# Patient Record
Sex: MALE | Race: WHITE | NOT HISPANIC OR LATINO | Employment: OTHER | URBAN - METROPOLITAN AREA
[De-identification: names, ages, dates, MRNs, and addresses within clinical notes are randomized per-mention and may not be internally consistent; named-entity substitution may affect disease eponyms.]

---

## 2017-05-19 ENCOUNTER — TELEPHONE (OUTPATIENT)
Dept: PEDIATRICS | Facility: CLINIC | Age: 32
End: 2017-05-19

## 2017-05-19 ENCOUNTER — OFFICE VISIT (OUTPATIENT)
Dept: FAMILY MEDICINE | Facility: CLINIC | Age: 32
End: 2017-05-19
Payer: COMMERCIAL

## 2017-05-19 VITALS
OXYGEN SATURATION: 98 % | BODY MASS INDEX: 28.5 KG/M2 | DIASTOLIC BLOOD PRESSURE: 68 MMHG | WEIGHT: 203.6 LBS | HEIGHT: 71 IN | SYSTOLIC BLOOD PRESSURE: 117 MMHG | HEART RATE: 57 BPM | TEMPERATURE: 97.4 F

## 2017-05-19 DIAGNOSIS — R07.0 THROAT PAIN: Primary | ICD-10-CM

## 2017-05-19 LAB
DEPRECATED S PYO AG THROAT QL EIA: NORMAL
MICRO REPORT STATUS: NORMAL
SPECIMEN SOURCE: NORMAL

## 2017-05-19 PROCEDURE — 99213 OFFICE O/P EST LOW 20 MIN: CPT | Performed by: NURSE PRACTITIONER

## 2017-05-19 PROCEDURE — 87081 CULTURE SCREEN ONLY: CPT | Performed by: NURSE PRACTITIONER

## 2017-05-19 PROCEDURE — 87880 STREP A ASSAY W/OPTIC: CPT | Performed by: NURSE PRACTITIONER

## 2017-05-19 NOTE — NURSING NOTE
"Chief Complaint   Patient presents with     Pharyngitis     x 1 day; wife and son have both been dx with strep       Initial /68  Pulse 57  Temp 97.4  F (36.3  C) (Tympanic)  Ht 5' 11.25\" (1.81 m)  Wt 203 lb 9.6 oz (92.4 kg)  SpO2 98%  BMI 28.2 kg/m2 Estimated body mass index is 28.2 kg/(m^2) as calculated from the following:    Height as of this encounter: 5' 11.25\" (1.81 m).    Weight as of this encounter: 203 lb 9.6 oz (92.4 kg).  Medication Reconciliation: complete  "

## 2017-05-19 NOTE — TELEPHONE ENCOUNTER
This is what I told them and took this from the OV note of daughter Mine Head from 5/16/17:  Parents may come in for nurse only throat swab in the next 7 days should they develop sore throats/fever. If + nurse can sent Rx for Penicillin  mg PO BID x 10 days. No allergies for either of them per father. IF they aren't showing improvement in 2-3 days they need to come in for evaluation. Thank you, ALLISON Lou CNP

## 2017-05-19 NOTE — TELEPHONE ENCOUNTER
Vadim, pt, called in today explaining that he has a sore throat today.  In addition to strep throat in the household, pt had a minor dental surgery yesterday, dental scraping.  Today, pt reports a sore throat.  Pt has a pending appt today with Lalita Hector at 2:20.  Given additional exposure to bacterial load from dental procedure, I recommend pt keep his appt today as he may require stronger antibiotic course consideration in addition to a throat swab.  Routed to Lalita.    Please advise.  Thank you.

## 2017-05-19 NOTE — MR AVS SNAPSHOT
After Visit Summary   5/19/2017    Vadim Head    MRN: [id number]           Patient Information     Date Of Birth          1985        Visit Information        Provider Department      5/19/2017 2:20 PM Lalita Hector APRN CNP [facility]        Today's Diagnoses     Throat pain    -  1      Care Instructions          Thank you for choosing [facility].  You may be receiving a survey in the mail from [facility] regarding your visit today.  Please take a few minutes to complete and return the survey to let us know how we are doing.      If you have questions or concerns, please contact us via Inkive or you can contact your care team at [phone].    Our Clinic hours are:  Monday 6:40 am  to 7:00 pm  Tuesday -Friday 6:40 am to 5:00 pm    The [place] outpatient lab hours are:  Monday - Friday 6:10 am to 4:45 pm  Saturdays 7:00 am to 11:00 am  Appointments are required, call [phone]    If you have clinical questions after hours or would like to schedule an appointment,  call the clinic at [phone].        Follow-ups after your visit        Who to contact     If you have questions or need follow up information about today's clinic visit or your schedule please contact [facility] directly at [phone].  Normal or non-critical lab and imaging results will be communicated to you by [employer] System[employer]art, letter or phone within 4 business days after the clinic has received the results. If you do not hear from us within 7 days, please contact the clinic through Inkive or phone. If you have a critical or abnormal lab result, we will notify you by phone as soon as possible.  Submit refill requests through Inkive or call your pharmacy and they will forward the refill request to us. Please allow 3 business days for your refill to be completed.          Additional Information About Your Visit        [employer] Information     Inkive lets you send messages to  "your doctor, view your test results, renew your prescriptions, schedule appointments and more. To sign up, go to [email or web address] . Click on \"Log in\" on the left side of the screen, which will take you to the Welcome page. Then click on \"Sign up Now\" on the right side of the page.     You will be asked to enter the access code listed below, as well as some personal information. Please follow the directions to create your username and password.     Your access code is: [email or web address]  Expires: 2017  2:48 PM     Your access code will  in 90 days. If you need help or a new code, please call your [place] clinic or [phone].        Care EveryWhere ID     This is your Care EveryWhere ID. This could be used by other organizations to access your [place] medical records  [id number]        Your Vitals Were     Pulse Temperature Height Pulse Oximetry BMI (Body Mass Index)       57 97.4  F (36.3  C) (Tympanic) 5' 11.25\" (1.81 m) 98% 28.2 kg/m2        Blood Pressure from Last 3 Encounters:   17 117/68   16 109/73   10/06/15 103/67    Weight from Last 3 Encounters:   17 203 lb 9.6 oz (92.4 kg)   16 219 lb 9.6 oz (99.6 kg)   14 226 lb (102.5 kg)              We Performed the Following     Beta strep group A culture     Strep, Rapid Screen        Primary Care Provider    Unknown Primary MD Miriam       No address on file        Thank you!     Thank you for choosing [facility]  for your care. Our goal is always to provide you with excellent care. Hearing back from our patients is one way we can continue to improve our services. Please take a few minutes to complete the written survey that you may receive in the mail after your visit with us. Thank you!             Your Updated Medication List - Protect others around you: Learn how to safely use, store and throw away your medicines at www.disposemymeds.org.          This list is accurate as of: 17  2:48 PM.  Always " use your most recent med list.                   Brand Name Dispense Instructions for use    EPINEPHrine 0.3 MG/0.3ML injection     1 each    Inject 0.3 mLs (0.3 mg) into the muscle once as needed for anaphylaxis       ibuprofen 600 MG tablet    ADVIL/MOTRIN    40 tablet    Take 1 tablet (600 mg) by mouth every 6 hours       NO ACTIVE MEDICATIONS

## 2017-05-19 NOTE — PROGRESS NOTES
"  SUBJECTIVE:                                                    Vadim Head is a 31 year old male who presents to clinic today for the following health issues:      ENT Symptoms             Symptoms: cc Present Absent Comment   Fever/Chills   x    Fatigue   x    Muscle Aches   x    Eye Irritation   x    Sneezing   x    Nasal Chaz/Drg   x    Sinus Pressure/Pain   x    Loss of smell   x    Dental pain    Just had gum surgery yesterday   Sore Throat  x     Swollen Glands  x     Ear Pain/Fullness   x    Cough   x    Wheeze   x    Chest Pain   x    Shortness of breath   x    Rash   x    Other   x      Symptom duration:  1 day   Symptom severity:  mild   Treatments tried:  none   Contacts:  wife, son have strep         Problem list and histories reviewed & adjusted, as indicated.  Additional history: as documented      Reviewed and updated as needed this visit by clinical staff  Tobacco  Allergies  Med Hx  Surg Hx  Fam Hx  Soc Hx      Reviewed and updated as needed this visit by Provider         ROS:  Constitutional, HEENT, cardiovascular, pulmonary, gi and gu systems are negative, except as otherwise noted.    OBJECTIVE:                                                    /68  Pulse 57  Temp 97.4  F (36.3  C) (Tympanic)  Ht 5' 11.25\" (1.81 m)  Wt 203 lb 9.6 oz (92.4 kg)  SpO2 98%  BMI 28.2 kg/m2  Body mass index is 28.2 kg/(m^2).  GENERAL: healthy, alert and no distress  HENT: ear canals and TM's normal, nose and mouth without ulcers or lesions  NECK: no adenopathy, no asymmetry, masses, or scars and thyroid normal to palpation  RESP: lungs clear to auscultation - no rales, rhonchi or wheezes  CV: regular rate and rhythm, normal S1 S2, no S3 or S4, no murmur, click or rub, no peripheral edema and peripheral pulses strong    Diagnostic Test Results:  Results for orders placed or performed in visit on 05/19/17 (from the past 24 hour(s))   Strep, Rapid Screen   Result Value Ref Range    Specimen " Description Throat     Rapid Strep A Screen       NEGATIVE: No Group A streptococcal antigen detected by immunoassay, await   culture report.      Micro Report Status FINAL 05/19/2017         ASSESSMENT/PLAN:                                                        ICD-10-CM    1. Throat pain R07.0 Strep, Rapid Screen     Beta strep group A culture     Strep negative.  Etiology viral vs irritation from dental surgery yesterday.  Will await strep culture results.      The risks, benefits and treatment options of prescribed medications or other treatments have been discussed with the patient. The patient verbalized their understanding and should call or follow up if no improvement or if they develop further problems.    ALLISON Gifford [facility]

## 2017-05-19 NOTE — PATIENT INSTRUCTIONS
Thank you for choosing [facility].  You may be receiving a survey in the mail from [employer]une[employer] regarding your visit today.  Please take a few minutes to complete and return the survey to let us know how we are doing.      If you have questions or concerns, please contact us via [employer] or you can contact your care team at [phone].    Our Clinic hours are:  Monday 6:40 am  to 7:00 pm  Tuesday -Friday 6:40 am to 5:00 pm    The [place] outpatient lab hours are:  Monday - Friday 6:10 am to 4:45 pm  Saturdays 7:00 am to 11:00 am  Appointments are required, call [phone]    If you have clinical questions after hours or would like to schedule an appointment,  call the clinic at [phone].

## 2017-05-19 NOTE — TELEPHONE ENCOUNTER
Wife, jameel called stating that patient's children were seen on Tuesday 05/16/17;-- was told if other family members have symptoms of strep to call clinic for antibiotic. Wife reports she has strep and was seen in clinic and is being treated for strep-- when jameel called Myriam was unavailable-- done for the day(jameel had OV, later in the day). Jameel also reports that patient had a dental procedure done-- gum scrapping, yesterday- and has 1 stitch-. Was able schedule OV as back up  Today, 220p with EVANGELISTA Hector.  Patient has complaints of sore throat.     [facility] .     [facility]awn[facility]  [facility]

## 2017-05-19 NOTE — TELEPHONE ENCOUNTER
Information given to pt. He verbalizes understanding. He will keep his office visit today.     Melisa Pelayo RN

## 2017-05-20 LAB
BACTERIA SPEC CULT: NORMAL
MICRO REPORT STATUS: NORMAL
SPECIMEN SOURCE: NORMAL

## 2017-12-13 ENCOUNTER — OFFICE VISIT (OUTPATIENT)
Dept: FAMILY MEDICINE | Facility: CLINIC | Age: 32
End: 2017-12-13
Payer: COMMERCIAL

## 2017-12-13 VITALS
BODY MASS INDEX: 28.56 KG/M2 | OXYGEN SATURATION: 100 % | TEMPERATURE: 97.7 F | HEIGHT: 71 IN | SYSTOLIC BLOOD PRESSURE: 124 MMHG | DIASTOLIC BLOOD PRESSURE: 79 MMHG | RESPIRATION RATE: 16 BRPM | HEART RATE: 69 BPM | WEIGHT: 204 LBS

## 2017-12-13 DIAGNOSIS — N52.9 ERECTILE DYSFUNCTION, UNSPECIFIED ERECTILE DYSFUNCTION TYPE: Primary | ICD-10-CM

## 2017-12-13 PROCEDURE — 99213 OFFICE O/P EST LOW 20 MIN: CPT | Performed by: NURSE PRACTITIONER

## 2017-12-13 RX ORDER — SILDENAFIL 25 MG/1
25 TABLET, FILM COATED ORAL DAILY PRN
Qty: 12 TABLET | Refills: 11 | Status: SHIPPED | OUTPATIENT
Start: 2017-12-13 | End: 2020-01-23

## 2017-12-13 NOTE — NURSING NOTE
"No chief complaint on file.      Initial /79  Pulse 69  Temp 97.7  F (36.5  C) (Tympanic)  Resp 16  Ht 5' 11.25\" (1.81 m)  Wt 204 lb (92.5 kg)  SpO2 100%  BMI 28.25 kg/m2 Estimated body mass index is 28.25 kg/(m^2) as calculated from the following:    Height as of this encounter: 5' 11.25\" (1.81 m).    Weight as of this encounter: 204 lb (92.5 kg).  Medication Reconciliation: complete  "

## 2017-12-13 NOTE — PATIENT INSTRUCTIONS
Evaluating Erectile Dysfunction     Doctor talking to man.     Many men feel embarrassed to talk to a doctor about erectile dysfunction (ED). This common problem can be treated, but only if your doctor knows about it. Your doctor will likely ask you questions about your ED. Whether you re asked or not, tell your doctor anything that might help your doctor understand the problem. Your doctor may do an exam and may run some tests to help find the cause of your ED.  A simple exam  A medical exam may help your doctor understand what is causing your problem. ED is sometimes the first sign of some other health problem, so your doctor may check your overall health. He or she may also examine your penis, scrotum, and testicles. Tell your doctor about all of the medicines you take, including prescribed and over-the-counter medicines, as well as any herbs or supplements.  You may have some tests  Your doctor may recommend some or all of these tests:    Blood tests measure your levels of hormones or lipids (fatty substances in the blood, including cholesterol). Other tests check for diabetes or help show the health of your liver, kidneys, and prostate.    Blood flow tests check how well blood moves through your penis.    A rectal exam checks for an enlarged prostate gland. An enlarged prostate and ED have been linked in recent studies.    Additional tests check for other conditions that limit your ability to have intercourse.  Your treatment plan  Based on what you say and what any exam shows, your doctor will recommend a treatment plan. The first step may be to try ED medicines, since they help most men. If they don t help you, your doctor can suggest other kinds of treatment. You and your partner may also want to discuss which options would work best in your relationship. Treatment may include addressing the cause of health problems, such as lowering your cholesterol. And counseling may be recommended to talk about  underlying emotional issues.  Date Last Reviewed: 1/1/2017 2000-2017 [employer]. [address] [place] [address], [place] [address]. All rights reserved. This information is not intended as a substitute for professional medical care. Always follow your healthcare professional's instructions.        Oral Medicines for Erectile Dysfunction  You can use prescription oral medicine for ED. They often work well. But, like all medicines, they can have side effects. Also, you can t use them if you have certain health problems or take certain other medicines. Talk with your doctor about oral ED medicine. Ask whether it is right for you.  Types of oral ED medicines  There are three types of prescription oral ED medicines. Each one increases blood flow to the penis. When the penis is stimulated, an erection results. The types are:    Sildenafil citrate     Tadalafil     Vardenafil    Avanfil  What oral ED medicines don t do  There are some things ED medicines can t do.    They don t cure the cause of your ED. Without the medicine, you ll still have trouble getting an erection.    They can t produce an erection without sexual stimulation.    They won t increase sexual desire. They also won t solve any other sexual issues. Psychological, emotional, or relationship issues will not be fixed.  Taking oral ED medicines safely    Do not combine ED medicines with other treatments unless your doctor tells you to.    Don t take ED medicines more often or in larger doses than prescribed.    Tell your doctor your health history. Mention all medicines you take. This includes over-the-counter drugs, supplements, and herbs.    Ask your doctor about whether you can drink alcohol while taking ED medication.  Possible side effects of oral ED medicines    Headache    Facial flushing    Runny or stuffy nose    Indigestion    Distortion of your color vision for a short time    Sudden vision loss or hearing loss (rare)    Dizziness  Risks  of oral ED medicines    Do not take ED medicines if you take medicines containing nitrates. The combination may drop your blood pressure to a dangerous level. Nitrates include nitroglycerin (a drug for angina or chest pain). They are also in  poppers,  an inhaled recreational drug. If you re not sure whether you re taking nitrates, ask your doctor or pharmacist.    Medicines called alpha-blockers can interact with ED medicines. They can cause a sudden drop in blood pressure. Alpha-blockers are a common treatment for prostate problems. They also treat high blood pressure. Be sure your doctor knows if you take these medicines.    If you ve had a heart attack or have heart disease and you have not had sex for a while, talk to your doctor. Having sex again can put extra strain on your heart. Your doctor can confirm that your heart is healthy enough for sex.    It is rare, but some men taking ED medicines have had sudden vision loss. This may be more likely if other health problems are present. These include high blood pressure and diabetes. Ask your doctor if you are at risk for this type of vision loss.    In rare cases, an erection may last too long. This can badly damage the blood vessels in your penis. If an erection lasts longer than 4 hours, go to the emergency room right away.   Date Last Reviewed: 1/1/2017 2000-2017 [employer]. [address] [place] [address], [place] [id number]. All rights reserved. This information is not intended as a substitute for professional medical care. Always follow your healthcare professional's instructions.        Understanding Erectile Dysfunction    Erectile dysfunction (ED) is a problem getting an erection firm enough or keeping it long enough for intercourse. The problem can happen to any man at any age. But health problems that can lead to ED become more common as a man ages. Up to half of men over age 40 experience ED at some point.  Causes of ED  ED can have many  causes. Most are physical. Some are emotional issues. Often, a combination of causes is involved. Causes of ED may include:    Medical conditions such as diabetes or depression    Smoking tobacco or marijuana    Drinking too much alcohol    Side effects of medications    Injury to nerves or blood vessels    Emotional issues such as stress or relationship problems  ED can be treated  Prescription medications for ED are available. They help many men who try them. Depending upon the cause of the ED, though, medications may not be enough. In these cases, other treatment options are available. These include erectile aids and surgery. Your health care provider can tell you more about the treatment that is right for you. And new treatments for ED are being studied. No matter what the treatment you decide on, stay in touch with your doctor. If your symptoms persist, he or she may be able to adjust your current treatment or try something new.  Date Last Reviewed: 1/1/2017 2000-2017 [employer]. [address] [place] [address], [place] [address]. All rights reserved. This information is not intended as a substitute for professional medical care. Always follow your healthcare professional's instructions.

## 2017-12-13 NOTE — MR AVS SNAPSHOT
After Visit Summary   12/13/2017    Vadim Head    MRN: [id number]           Patient Information     Date Of Birth          1985        Visit Information        Provider Department      12/13/2017 1:40 PM Myriam Summers APRN [facility]        Today's Diagnoses     Erectile dysfunction, unspecified erectile dysfunction type    -  1      Care Instructions      Evaluating Erectile Dysfunction     Doctor talking to man.     Many men feel embarrassed to talk to a doctor about erectile dysfunction (ED). This common problem can be treated, but only if your doctor knows about it. Your doctor will likely ask you questions about your ED. Whether you re asked or not, tell your doctor anything that might help your doctor understand the problem. Your doctor may do an exam and may run some tests to help find the cause of your ED.  A simple exam  A medical exam may help your doctor understand what is causing your problem. ED is sometimes the first sign of some other health problem, so your doctor may check your overall health. He or she may also examine your penis, scrotum, and testicles. Tell your doctor about all of the medicines you take, including prescribed and over-the-counter medicines, as well as any herbs or supplements.  You may have some tests  Your doctor may recommend some or all of these tests:    Blood tests measure your levels of hormones or lipids (fatty substances in the blood, including cholesterol). Other tests check for diabetes or help show the health of your liver, kidneys, and prostate.    Blood flow tests check how well blood moves through your penis.    A rectal exam checks for an enlarged prostate gland. An enlarged prostate and ED have been linked in recent studies.    Additional tests check for other conditions that limit your ability to have intercourse.  Your treatment plan  Based on what you say and what any exam shows, your doctor will recommend a  treatment plan. The first step may be to try ED medicines, since they help most men. If they don t help you, your doctor can suggest other kinds of treatment. You and your partner may also want to discuss which options would work best in your relationship. Treatment may include addressing the cause of health problems, such as lowering your cholesterol. And counseling may be recommended to talk about underlying emotional issues.  Date Last Reviewed: 1/1/2017 2000-2017 [employer]. [address] [place] [address], [place] [id number]. All rights reserved. This information is not intended as a substitute for professional medical care. Always follow your healthcare professional's instructions.        Oral Medicines for Erectile Dysfunction  You can use prescription oral medicine for ED. They often work well. But, like all medicines, they can have side effects. Also, you can t use them if you have certain health problems or take certain other medicines. Talk with your doctor about oral ED medicine. Ask whether it is right for you.  Types of oral ED medicines  There are three types of prescription oral ED medicines. Each one increases blood flow to the penis. When the penis is stimulated, an erection results. The types are:    Sildenafil citrate     Tadalafil     Vardenafil    Avanfil  What oral ED medicines don t do  There are some things ED medicines can t do.    They don t cure the cause of your ED. Without the medicine, you ll still have trouble getting an erection.    They can t produce an erection without sexual stimulation.    They won t increase sexual desire. They also won t solve any other sexual issues. Psychological, emotional, or relationship issues will not be fixed.  Taking oral ED medicines safely    Do not combine ED medicines with other treatments unless your doctor tells you to.    Don t take ED medicines more often or in larger doses than prescribed.    Tell your doctor your health history.  Mention all medicines you take. This includes over-the-counter drugs, supplements, and herbs.    Ask your doctor about whether you can drink alcohol while taking ED medication.  Possible side effects of oral ED medicines    Headache    Facial flushing    Runny or stuffy nose    Indigestion    Distortion of your color vision for a short time    Sudden vision loss or hearing loss (rare)    Dizziness  Risks of oral ED medicines    Do not take ED medicines if you take medicines containing nitrates. The combination may drop your blood pressure to a dangerous level. Nitrates include nitroglycerin (a drug for angina or chest pain). They are also in  poppers,  an inhaled recreational drug. If you re not sure whether you re taking nitrates, ask your doctor or pharmacist.    Medicines called alpha-blockers can interact with ED medicines. They can cause a sudden drop in blood pressure. Alpha-blockers are a common treatment for prostate problems. They also treat high blood pressure. Be sure your doctor knows if you take these medicines.    If you ve had a heart attack or have heart disease and you have not had sex for a while, talk to your doctor. Having sex again can put extra strain on your heart. Your doctor can confirm that your heart is healthy enough for sex.    It is rare, but some men taking ED medicines have had sudden vision loss. This may be more likely if other health problems are present. These include high blood pressure and diabetes. Ask your doctor if you are at risk for this type of vision loss.    In rare cases, an erection may last too long. This can badly damage the blood vessels in your penis. If an erection lasts longer than 4 hours, go to the emergency room right away.   Date Last Reviewed: 1/1/2017 2000-2017 [employer]. [address] [place] [address], [place] [address]. All rights reserved. This information is not intended as a substitute for professional medical care. Always follow your  healthcare professional's instructions.        Understanding Erectile Dysfunction    Erectile dysfunction (ED) is a problem getting an erection firm enough or keeping it long enough for intercourse. The problem can happen to any man at any age. But health problems that can lead to ED become more common as a man ages. Up to half of men over age 40 experience ED at some point.  Causes of ED  ED can have many causes. Most are physical. Some are emotional issues. Often, a combination of causes is involved. Causes of ED may include:    Medical conditions such as diabetes or depression    Smoking tobacco or marijuana    Drinking too much alcohol    Side effects of medications    Injury to nerves or blood vessels    Emotional issues such as stress or relationship problems  ED can be treated  Prescription medications for ED are available. They help many men who try them. Depending upon the cause of the ED, though, medications may not be enough. In these cases, other treatment options are available. These include erectile aids and surgery. Your health care provider can tell you more about the treatment that is right for you. And new treatments for ED are being studied. No matter what the treatment you decide on, stay in touch with your doctor. If your symptoms persist, he or she may be able to adjust your current treatment or try something new.  Date Last Reviewed: 1/1/2017 2000-2017 [employer]. [address] [place] [address], [place] [address]. All rights reserved. This information is not intended as a substitute for professional medical care. Always follow your healthcare professional's instructions.                Follow-ups after your visit        Additional Services     [facility]       Reason for referral? Erectile dysfunction- ? Anxiety referral  Is the patient transgengered? No    Appointment Line: [phone]            UROLOGY ADULT REFERRAL       Your provider has referred you  to: KELBY: [facility] [phone]   [email or web address]    Please be aware that coverage of these services is subject to the terms and limitations of your health insurance plan.  Call member services at your health plan with any benefit or coverage questions.      Please bring the following with you to your appointment:    (1) Any X-Rays, CTs or MRIs which have been performed.  Contact the facility where they were done to arrange for  prior to your scheduled appointment.    (2) List of current medications  (3) This referral request   (4) Any documents/labs given to you for this referral                  Future tests that were ordered for you today     Open Future Orders        Priority Expected Expires Ordered    Lipid panel reflex to direct LDL Fasting Routine  12/13/2018 12/13/2017    Hemoglobin A1c Routine  12/13/2018 12/13/2017    TSH with free T4 reflex Routine  12/13/2018 12/13/2017    Testosterone, total Routine  12/13/2018 12/13/2017            Who to contact     If you have questions or need follow up information about today's clinic visit or your schedule please contact [facility] directly at [phone].  Normal or non-critical lab and imaging results will be communicated to you by [employer], letter or phone within 4 business days after the clinic has received the results. If you do not hear from us within 7 days, please contact the clinic through [employer] Knight[employer] or phone. If you have a critical or abnormal lab result, we will notify you by phone as soon as possible.  Submit refill requests through [employer] or call your pharmacy and they will forward the refill request to us. Please allow 3 business days for your refill to be completed.          Additional Information About Your Visit        [employer] Information     [employer] lets you send messages to your doctor, view your test results, renew  "your prescriptions, schedule appointments and more. To sign up, go to [email or web address] . Click on \"Log in\" on the left side of the screen, which will take you to the Welcome page. Then click on \"Sign up Now\" on the right side of the page.     You will be asked to enter the access code listed below, as well as some personal information. Please follow the directions to create your username and password.     Your access code is: [email or web address]  Expires: 3/13/2018  2:23 PM     Your access code will  in 90 days. If you need help or a new code, please call your [facility] clinic or [phone].        Care EveryWhere ID     This is your Care EveryWhere ID. This could be used by other organizations to access your [facility] medical records  [id number]        Your Vitals Were     Pulse Temperature Respirations Height Pulse Oximetry BMI (Body Mass Index)    69 97.7  F (36.5  C) (Tympanic) 16 5' 11.25\" (1.81 m) 100% 28.25 kg/m2       Blood Pressure from Last 3 Encounters:   17 124/79   17 117/68   16 109/73    Weight from Last 3 Encounters:   17 204 lb (92.5 kg)   17 203 lb 9.6 oz (92.4 kg)   16 219 lb 9.6 oz (99.6 kg)              We Performed the Following     [facility] SEXUAL HEALTH REFERRAL     UROLOGY ADULT REFERRAL          Today's Medication Changes          These changes are accurate as of: 17  2:23 PM.  If you have any questions, ask your nurse or doctor.               Start taking these medicines.        Dose/Directions    sildenafil 25 MG tablet   Commonly known as:  VIAGRA   Used for:  Erectile dysfunction, unspecified erectile dysfunction type   Started by:  Myriam Summers APRN CNP        Dose:  25 mg   Take 1 tablet (25 mg) by mouth daily as needed 30 min to 4 hrs before sex. Do not use with nitroglycerin, terazosin or doxazosin.   Quantity:  12 tablet   Refills:  11            Where to get your medicines      These medications were sent to [facility] [place], [place] - [address] [facility] [address]  [address] [facility] [address]     Phone:  [phone]     sildenafil 25 MG tablet                Primary Care Provider    None Specified       No primary provider on file.        Equal Access to Services     MEL CARMONA : Hadii aad ku hadtyemesha Israel, waaxda luqadaha, qaybta kaalmada adepablo, ced nghia haysweetie jaimektreza trujillo. So [facility] [phone].    ATENCIÓN: Si habla español, tiene a sosa disposición servicios gratuitos de asistencia lingüística. Llame al [phone].    We comply with applicable federal civil rights laws and [place] laws. We do not discriminate on the basis of race, color, national origin, age, disability, sex, sexual orientation, or gender identity.            Thank you!     Thank you for choosing [facility]  for your care. Our goal is always to provide you with excellent care. Hearing back from our patients is one way we can continue to improve our services. Please take a few minutes to complete the written survey that you may receive in the mail after your visit with us. Thank you!             Your Updated Medication List - Protect others around you: Learn how to safely use, store and throw away your medicines at www.disposemymeds.org.          This list is accurate as of: 12/13/17  2:23 PM.  Always use your most recent med list.                   Brand Name Dispense Instructions for use Diagnosis    EPINEPHrine 0.3 MG/0.3ML injection 2-pack    EPIPEN/ADRENACLICK/or ANY BX GENERIC EQUIV    1 each    Inject 0.3 mLs (0.3 mg) into the muscle once as needed for anaphylaxis        ibuprofen 600 MG tablet    ADVIL/MOTRIN    40 tablet    Take 1 tablet (600 mg) by mouth every 6 hours    Knee pain, left       NO ACTIVE MEDICATIONS           OMEGA 3 PO           sildenafil 25 MG tablet    VIAGRA    12 tablet    Take 1 tablet (25 mg) by mouth daily as needed 30 min to 4 hrs before sex. Do not use with nitroglycerin,  terazosin or doxazosin.    Erectile dysfunction, unspecified erectile dysfunction type

## 2017-12-13 NOTE — PROGRESS NOTES
"  SUBJECTIVE:   Vadim Head is a 32 year old male who presents to clinic today for the following health issues:      Concern - Intimacy Issues    Patient is here today to discuss issues with being sexually involved with wife of 13 years. He states that they have been having marital problems x 8 months and seperated x7 weeks. They are trying to make things work and he is having a hard time becoming erect for intercourse. Can have a normal erection in the morning. Denies testicular pain or masses or other abnormal penile symptoms. No dysuria. Patient on testosterone booster for muscle building, no other meds. Denies alcohol abuse.      Problem list and histories reviewed & adjusted, as indicated.  Additional history: as documented    Patient Active Problem List   Diagnosis     CARDIOVASCULAR SCREENING; LDL GOAL LESS THAN 160     History reviewed. No pertinent surgical history.    Social History   Substance Use Topics     Smoking status: Never Smoker     Smokeless tobacco: Never Used     Alcohol use Yes      Comment: one drink per night     Family History   Problem Relation Age of Onset     C.A.D. Father      CABG     Lipids Father      Respiratory Maternal Grandfather      lung problem.     DIABETES Paternal Grandfather      Hypertension Paternal Grandfather              Reviewed and updated as needed this visit by clinical staffTobacco  Allergies  Med Hx  Surg Hx  Fam Hx  Soc Hx      Reviewed and updated as needed this visit by Provider         ROS:  Constitutional, HEENT, cardiovascular, pulmonary, gi and gu systems are negative, except as otherwise noted.      OBJECTIVE:   /79  Pulse 69  Temp 97.7  F (36.5  C) (Tympanic)  Resp 16  Ht 5' 11.25\" (1.81 m)  Wt 204 lb (92.5 kg)  SpO2 100%  BMI 28.25 kg/m2  Body mass index is 28.25 kg/(m^2).  GENERAL: healthy, alert and no distress  EYES: Eyes grossly normal to inspection, PERRL and conjunctivae and sclerae normal  NECK: no adenopathy, no asymmetry, " masses, or scars and thyroid normal to palpation  RESP: lungs clear to auscultation - no rales, rhonchi or wheezes  CV: regular rates and rhythm, normal S1 S2, no S3 or S4 and no murmur, click or rub  SKIN: no suspicious lesions or rashes  NEURO: Normal strength and tone, mentation intact and speech normal  PSYCH: mentation appears normal, affect normal/bright, anxious and judgement and insight intact    Diagnostic Test Results:  none     ASSESSMENT/PLAN:       ICD-10-CM    1. Erectile dysfunction, unspecified erectile dysfunction type N52.9 Lipid panel reflex to direct LDL Fasting     Hemoglobin A1c     TSH with free T4 reflex     Testosterone, total     sildenafil (VIAGRA) 25 MG tablet     [facility] RE[facility]     UROLOGY ADULT REFERRAL       CONSULTATION/REFERRAL to Sexual health referral- problems likely related to anxiety/relationship issues. May trial Viagra, if unable to achieve erection follow up with Urology. Labs pending.     Patient Instructions       Evaluating Erectile Dysfunction     Doctor talking to man.     Many men feel embarrassed to talk to a doctor about erectile dysfunction (ED). This common problem can be treated, but only if your doctor knows about it. Your doctor will likely ask you questions about your ED. Whether you re asked or not, tell your doctor anything that might help your doctor understand the problem. Your doctor may do an exam and may run some tests to help find the cause of your ED.  A simple exam  A medical exam may help your doctor understand what is causing your problem. ED is sometimes the first sign of some other health problem, so your doctor may check your overall health. He or she may also examine your penis, scrotum, and testicles. Tell your doctor about all of the medicines you take, including prescribed and over-the-counter medicines, as well as any herbs or supplements.  You may have some tests  Your doctor may recommend some or all of these  tests:    Blood tests measure your levels of hormones or lipids (fatty substances in the blood, including cholesterol). Other tests check for diabetes or help show the health of your liver, kidneys, and prostate.    Blood flow tests check how well blood moves through your penis.    A rectal exam checks for an enlarged prostate gland. An enlarged prostate and ED have been linked in recent studies.    Additional tests check for other conditions that limit your ability to have intercourse.  Your treatment plan  Based on what you say and what any exam shows, your doctor will recommend a treatment plan. The first step may be to try ED medicines, since they help most men. If they don t help you, your doctor can suggest other kinds of treatment. You and your partner may also want to discuss which options would work best in your relationship. Treatment may include addressing the cause of health problems, such as lowering your cholesterol. And counseling may be recommended to talk about underlying emotional issues.  Date Last Reviewed: 1/1/2017 2000-2017 [employer]. [address] [place] [address]. All rights reserved. This information is not intended as a substitute for professional medical care. Always follow your healthcare professional's instructions.        Oral Medicines for Erectile Dysfunction  You can use prescription oral medicine for ED. They often work well. But, like all medicines, they can have side effects. Also, you can t use them if you have certain health problems or take certain other medicines. Talk with your doctor about oral ED medicine. Ask whether it is right for you.  Types of oral ED medicines  There are three types of prescription oral ED medicines. Each one increases blood flow to the penis. When the penis is stimulated, an erection results. The types are:    Sildenafil citrate     Tadalafil     Vardenafil    Avanfil  What oral ED medicines don t do  There are some  things ED medicines can t do.    They don t cure the cause of your ED. Without the medicine, you ll still have trouble getting an erection.    They can t produce an erection without sexual stimulation.    They won t increase sexual desire. They also won t solve any other sexual issues. Psychological, emotional, or relationship issues will not be fixed.  Taking oral ED medicines safely    Do not combine ED medicines with other treatments unless your doctor tells you to.    Don t take ED medicines more often or in larger doses than prescribed.    Tell your doctor your health history. Mention all medicines you take. This includes over-the-counter drugs, supplements, and herbs.    Ask your doctor about whether you can drink alcohol while taking ED medication.  Possible side effects of oral ED medicines    Headache    Facial flushing    Runny or stuffy nose    Indigestion    Distortion of your color vision for a short time    Sudden vision loss or hearing loss (rare)    Dizziness  Risks of oral ED medicines    Do not take ED medicines if you take medicines containing nitrates. The combination may drop your blood pressure to a dangerous level. Nitrates include nitroglycerin (a drug for angina or chest pain). They are also in  poppers,  an inhaled recreational drug. If you re not sure whether you re taking nitrates, ask your doctor or pharmacist.    Medicines called alpha-blockers can interact with ED medicines. They can cause a sudden drop in blood pressure. Alpha-blockers are a common treatment for prostate problems. They also treat high blood pressure. Be sure your doctor knows if you take these medicines.    If you ve had a heart attack or have heart disease and you have not had sex for a while, talk to your doctor. Having sex again can put extra strain on your heart. Your doctor can confirm that your heart is healthy enough for sex.    It is rare, but some men taking ED medicines have had sudden vision loss. This may  be more likely if other health problems are present. These include high blood pressure and diabetes. Ask your doctor if you are at risk for this type of vision loss.    In rare cases, an erection may last too long. This can badly damage the blood vessels in your penis. If an erection lasts longer than 4 hours, go to the emergency room right away.   Date Last Reviewed: 1/1/2017 2000-2017 [employer]. [address] [place] [address], [place] [address]. All rights reserved. This information is not intended as a substitute for professional medical care. Always follow your healthcare professional's instructions.        Understanding Erectile Dysfunction    Erectile dysfunction (ED) is a problem getting an erection firm enough or keeping it long enough for intercourse. The problem can happen to any man at any age. But health problems that can lead to ED become more common as a man ages. Up to half of men over age 40 experience ED at some point.  Causes of ED  ED can have many causes. Most are physical. Some are emotional issues. Often, a combination of causes is involved. Causes of ED may include:    Medical conditions such as diabetes or depression    Smoking tobacco or marijuana    Drinking too much alcohol    Side effects of medications    Injury to nerves or blood vessels    Emotional issues such as stress or relationship problems  ED can be treated  Prescription medications for ED are available. They help many men who try them. Depending upon the cause of the ED, though, medications may not be enough. In these cases, other treatment options are available. These include erectile aids and surgery. Your health care provider can tell you more about the treatment that is right for you. And new treatments for ED are being studied. No matter what the treatment you decide on, stay in touch with your doctor. If your symptoms persist, he or she may be able to adjust your current treatment or try something new.  Date  Last Reviewed: 1/1/2017 2000-2017 [employer]. [address] [place] [address], [place] [address]. All rights reserved. This information is not intended as a substitute for professional medical care. Always follow your healthcare professional's instructions.            ALLISON Mcfarland [facility]

## 2017-12-14 DIAGNOSIS — N52.9 ERECTILE DYSFUNCTION, UNSPECIFIED ERECTILE DYSFUNCTION TYPE: ICD-10-CM

## 2017-12-14 LAB
CHOLEST SERPL-MCNC: 140 MG/DL
HBA1C MFR BLD: 4.9 % (ref 4.3–6)
HDLC SERPL-MCNC: 56 MG/DL
LDLC SERPL CALC-MCNC: 73 MG/DL
NONHDLC SERPL-MCNC: 84 MG/DL
TRIGL SERPL-MCNC: 57 MG/DL
TSH SERPL DL<=0.005 MIU/L-ACNC: 0.81 MU/L (ref 0.4–4)

## 2017-12-14 PROCEDURE — 84443 ASSAY THYROID STIM HORMONE: CPT | Performed by: NURSE PRACTITIONER

## 2017-12-14 PROCEDURE — 36415 COLL VENOUS BLD VENIPUNCTURE: CPT | Performed by: NURSE PRACTITIONER

## 2017-12-14 PROCEDURE — 83036 HEMOGLOBIN GLYCOSYLATED A1C: CPT | Performed by: NURSE PRACTITIONER

## 2017-12-14 PROCEDURE — 80061 LIPID PANEL: CPT | Performed by: NURSE PRACTITIONER

## 2017-12-14 PROCEDURE — 84403 ASSAY OF TOTAL TESTOSTERONE: CPT | Performed by: NURSE PRACTITIONER

## 2017-12-18 LAB — TESTOST SERPL-MCNC: 468 NG/DL (ref 240–950)

## 2018-02-16 ENCOUNTER — ALLIED HEALTH/NURSE VISIT (OUTPATIENT)
Dept: FAMILY MEDICINE | Facility: CLINIC | Age: 33
End: 2018-02-16
Payer: COMMERCIAL

## 2018-02-16 DIAGNOSIS — Z23 NEED FOR PROPHYLACTIC VACCINATION AND INOCULATION AGAINST INFLUENZA: Primary | ICD-10-CM

## 2018-02-16 PROCEDURE — 90471 IMMUNIZATION ADMIN: CPT

## 2018-02-16 PROCEDURE — 99207 ZZC NO CHARGE NURSE ONLY: CPT

## 2018-02-16 PROCEDURE — 90686 IIV4 VACC NO PRSV 0.5 ML IM: CPT

## 2018-02-16 NOTE — PROGRESS NOTES
Injectable Influenza Immunization Documentation    1.  Is the person to be vaccinated sick today?   No    2. Does the person to be vaccinated have an allergy to a component   of the vaccine?   No  Egg Allergy Algorithm Link    3. Has the person to be vaccinated ever had a serious reaction   to influenza vaccine in the past?   No    4. Has the person to be vaccinated ever had Guillain-Barré syndrome?   No    Form completed by Nora Frank CMA  Due to injection administration, patient instructed to remain in clinic for 15 minutes  afterwards, and to report any adverse reaction to me immediately.

## 2018-02-16 NOTE — MR AVS SNAPSHOT
"              After Visit Summary   2018    Vadim Head    MRN: [id number]           Patient Information     Date Of Birth          1985        Visit Information        Provider Department      2018 4:00 PM [facility] FLU SHOT CLINIC [facility]        Today's Diagnoses     Need for prophylactic vaccination and inoculation against influenza    -  1       Follow-ups after your visit        Who to contact     If you have questions or need follow up information about today's clinic visit or your schedule please contact [facility] directly at [phone].  Normal or non-critical lab and imaging results will be communicated to you by [employer], letter or phone within 4 business days after the clinic has received the results. If you do not hear from us within 7 days, please contact the clinic through [employer] or phone. If you have a critical or abnormal lab result, we will notify you by phone as soon as possible.  Submit refill requests through [facility] or call your pharmacy and they will forward the refill request to us. Please allow 3 business days for your refill to be completed.          Additional Information About Your Visit        MyChart Information     [facility] lets you send messages to your doctor, view your test results, renew your prescriptions, schedule appointments and more. To sign up, go to [email or web address] . Click on \"Log in\" on the left side of the screen, which will take you to the Welcome page. Then click on \"Sign up Now\" on the right side of the page.     You will be asked to enter the access code listed below, as well as some personal information. Please follow the directions to create your username and password.     Your access code is: [email or web address]  Expires: 3/13/2018  2:23 PM     Your access code will  in 90 days. If you need help or a new code, please call your [facility] or [phone].        Care EveryWhere ID     This is your Care " EveryWhere ID. This could be used by other organizations to access your [facility] medical records  [id number]         Blood Pressure from Last 3 Encounters:   12/13/17 124/79   05/19/17 117/68   02/18/16 109/73    Weight from Last 3 Encounters:   12/13/17 204 lb (92.5 kg)   05/19/17 203 lb 9.6 oz (92.4 kg)   02/18/16 219 lb 9.6 oz (99.6 kg)              We Performed the Following     FLU VAC, SPLIT VIRUS IM > 3 YO (QUADRIVALENT) [23051]     Vaccine Administration, Initial [58598]        Primary Care Provider Fax #    Physician No Ref-Primary [phone]       No address on file        Equal Access to Services     MEL CARMONA : Brent Wood, koko neely, nakita dawson, ced trujillo. So [facility] [phone].    ATENCIÓN: Si habla español, tiene a sosa disposición servicios gratuitos de asistencia lingüística. Llame al [phone].    We comply with applicable federal civil rights laws and [place] laws. We do not discriminate on the basis of race, color, national origin, age, disability, sex, sexual orientation, or gender identity.            Thank you!     Thank you for choosing [facility]  for your care. Our goal is always to provide you with excellent care. Hearing back from our patients is one way we can continue to improve our services. Please take a few minutes to complete the written survey that you may receive in the mail after your visit with us. Thank you!             Your Updated Medication List - Protect others around you: Learn how to safely use, store and throw away your medicines at www.disposemymeds.org.          This list is accurate as of 2/16/18  4:15 PM.  Always use your most recent med list.                   Brand Name Dispense Instructions for use Diagnosis    EPINEPHrine 0.3 MG/0.3ML injection 2-pack    EPIPEN/ADRENACLICK/or ANY BX GENERIC EQUIV    1 each    Inject 0.3 mLs (0.3 mg) into the muscle once as needed for  anaphylaxis        ibuprofen 600 MG tablet    ADVIL/MOTRIN    40 tablet    Take 1 tablet (600 mg) by mouth every 6 hours    Knee pain, left       NO ACTIVE MEDICATIONS           OMEGA 3 PO           sildenafil 25 MG tablet    VIAGRA    12 tablet    Take 1 tablet (25 mg) by mouth daily as needed 30 min to 4 hrs before sex. Do not use with nitroglycerin, terazosin or doxazosin.    Erectile dysfunction, unspecified erectile dysfunction type

## 2018-11-16 ENCOUNTER — OFFICE VISIT (OUTPATIENT)
Dept: ORTHOPEDICS | Facility: CLINIC | Age: 33
End: 2018-11-16
Payer: COMMERCIAL

## 2018-11-16 ENCOUNTER — RADIANT APPOINTMENT (OUTPATIENT)
Dept: GENERAL RADIOLOGY | Facility: CLINIC | Age: 33
End: 2018-11-16
Attending: PEDIATRICS
Payer: COMMERCIAL

## 2018-11-16 VITALS
HEIGHT: 72 IN | SYSTOLIC BLOOD PRESSURE: 121 MMHG | DIASTOLIC BLOOD PRESSURE: 74 MMHG | BODY MASS INDEX: 28.71 KG/M2 | WEIGHT: 212 LBS

## 2018-11-16 DIAGNOSIS — M25.562 BILATERAL KNEE PAIN: ICD-10-CM

## 2018-11-16 DIAGNOSIS — G89.29 CHRONIC PAIN OF BOTH KNEES: Primary | ICD-10-CM

## 2018-11-16 DIAGNOSIS — M25.561 BILATERAL KNEE PAIN: ICD-10-CM

## 2018-11-16 DIAGNOSIS — M25.561 CHRONIC PAIN OF BOTH KNEES: Primary | ICD-10-CM

## 2018-11-16 DIAGNOSIS — M25.562 CHRONIC PAIN OF BOTH KNEES: Primary | ICD-10-CM

## 2018-11-16 DIAGNOSIS — M25.561 ACUTE PAIN OF RIGHT KNEE: ICD-10-CM

## 2018-11-16 PROCEDURE — 73562 X-RAY EXAM OF KNEE 3: CPT | Mod: LT

## 2018-11-16 PROCEDURE — 99204 OFFICE O/P NEW MOD 45 MIN: CPT | Performed by: PEDIATRICS

## 2018-11-16 NOTE — PATIENT INSTRUCTIONS
Plan:  - Today's Plan of Care:  Over the counter medication: Acetaminophen (Tylenol) 1000mg every 6 hours with food (Maximum of 3000mg/day)  Naproxen (Aleve) maximum of 440mg two times a day with food  STOP IBUPROFEN  MRI of the right knee. Call [phone] to schedule MRI  Medical Equipment: knee sleeve  Continue with relative rest and activity modification, Ice, Compression, and Elevation.  Can apply ice 10-15 minutes3-4 times per day as needed.    -We also discussed other future treatment options:  Referral to orthopedic surgeon or Rehab: Physical Therapy    Follow Up: In clinic with Dr. Pillai after MRI (wait at least 1-2 days)    If you have any further questions for your physician or physician s care team you can call [phone] and use option 3 to leave a voice message. Calls received during business hours will be returned same day.

## 2018-11-16 NOTE — LETTER
11/16/2018         RE: Vadim Head  [id number] [address] [address]        Dear Colleague,    Thank you for referring your patient, Vadim Head, to the [facility]. Please see a copy of my visit note below.    Sports Medicine Clinic Visit    PCP: No Ref-Primary, Physician    Vadim Head is a 33 year old male who is seen  as a self referral presenting with bilateral knee pain    Injury: He reports chronic intermittent bilateral knee pain. He reports the pain has been consistent and increased for 2 months. He reports pain in his knee after mountain biking for 2-3 day after activities. He reports the pain is anterior and his right knee is worse then his left.  Right knee has locking and swelling currently.    Location of Pain: bilateral knee pain  Duration of Pain: 2 month(s)  Rating of Pain at worst: 9/10  Rating of Pain Currently: 7/10  Symptoms are better with: Rest  Symptoms are worse with: stairs and biking  Additional Features:   Positive: popping and catching, right knee swelling   Negative: swelling, bruising, grinding, locking, instability, paresthesias, numbness and weakness  Other evaluation and/or treatments so far consists of: Tylenol, Ibuprofen, Rest and bracing  Prior History of related problems: nothing    Social History: self employed owns own buisness    Review of Systems  Skin: no bruising, yes swelling  Musculoskeletal: as above  Neurologic: no numbness, paresthesias  Remainder of review of systems is negative including constitutional, CV, pulmonary, GI, except as noted in HPI or medical history.    Patient's current problem list, past medical and surgical history, and family history were reviewed.    Patient Active Problem List   Diagnosis     CARDIOVASCULAR SCREENING; LDL GOAL LESS THAN 160     No past medical history on file.  No past surgical history on file.  Family History   Problem Relation Age of Onset     C.A.D. Father      CABG      Lipids Father      Respiratory Maternal Grandfather      lung problem.     Diabetes Paternal Grandfather      Hypertension Paternal Grandfather          Objective  /74 (BP Location: Left arm, Patient Position: Chair, Cuff Size: Adult Regular)  Ht 6' (1.829 m)  Wt 212 lb (96.2 kg)  BMI 28.75 kg/m2    GENERAL APPEARANCE: healthy, alert and no distress   GAIT: NORMAL  SKIN: no suspicious lesions or rashes  HEENT: Sclera clear, anicteric  CV: good peripheral pulses  RESP: Breathing not labored  NEURO: Normal strength and tone, mentation intact and speech normal  PSYCH:  mentation appears normal and affect normal/bright    Bilateral Knee exam    Inspection:      moderate effusion right    Patella:      Mobility -       normal bilateral    Tender:      medial patellar border right       lateral patellar border right       medial joint line right       lateral joint line right    Non Tender:      remainder of knee area right  - minimal tenderness left    Knee ROM:      Full active and passive ROM with flexion and extension bilateral    Strength:      5/5 with knee extension bilateral    Special Tests:     positive (+) Hermann right, negative left       neg (-) Lachmans bilateral       neg (-) anterior drawer bilateral       neg (-) posterior drawer bilateral       neg (-) varus at 0 deg and 30 deg bilateral       neg (-) valgus at 0 deg and 30 deg bilateral    Gait:      normal    Neurovascular:      2+ peripheral pulses bilaterally and brisk capillary refill       sensation grossly intact    Radiology  I ordered, visualized and reviewed these images with the patient  3 XR views of bilateral reviewed: no acute bony abnormality, no significant degenerative change  - will follow official read    Assessment:  1. Chronic pain of both knees    2. Acute pain of right knee      Right knee pain and effusion concerning for meniscal tear, will obtain MRI to evaluate.  Discussed other supportive care including rest, ice,  elevation, compression and anti-inflammatory medications (I reviewed the mechanism of action as well as risk/benefit profile of medications. Questions answered.).    Left knee pain likely patellofemoral pain syndrome, will likely recommend physical therapy pending right knee MRI results.    Plan:  - Today's Plan of Care:  Over the counter medication: Acetaminophen (Tylenol) 1000mg every 6 hours with food (Maximum of 3000mg/day)  Naproxen (Aleve) maximum of 440mg two times a day with food  STOP IBUPROFEN  MRI of the right knee. Call [phone] to schedule MRI  Medical Equipment: knee sleeve  Continue with relative rest and activity modification, Ice, Compression, and Elevation.  Can apply ice 10-15 minutes3-4 times per day as needed.    -We also discussed other future treatment options:  Referral to orthopedic surgeon or Rehab: Physical Therapy    Follow Up: In clinic with Dr. Pillai after MRI (wait at least 1-2 days)    Concerning signs and symptoms were reviewed.  The patient expressed understanding of this management plan and all questions were answered at this time.    Zoie Pillai MD [facility]  Primary Care Sports Medicine  [facility]    Again, thank you for allowing me to participate in the care of your patient.        Sincerely,        Zoie Pillai MD

## 2018-11-16 NOTE — PROGRESS NOTES
Sports Medicine Clinic Visit    PCP: No Ref-Primary, Physician    Vadim LUND Adilene is a 33 year old male who is seen  as a self referral presenting with bilateral knee pain    Injury: He reports chronic intermittent bilateral knee pain. He reports the pain has been consistent and increased for 2 months. He reports pain in his knee after mountain biking for 2-3 day after activities. He reports the pain is anterior and his right knee is worse then his left.  Right knee has locking and swelling currently.    Location of Pain: bilateral knee pain  Duration of Pain: 2 month(s)  Rating of Pain at worst: 9/10  Rating of Pain Currently: 7/10  Symptoms are better with: Rest  Symptoms are worse with: stairs and biking  Additional Features:   Positive: popping and catching, right knee swelling   Negative: swelling, bruising, grinding, locking, instability, paresthesias, numbness and weakness  Other evaluation and/or treatments so far consists of: Tylenol, Ibuprofen, Rest and bracing  Prior History of related problems: nothing    Social History: self employed owns own buisness    Review of Systems  Skin: no bruising, yes swelling  Musculoskeletal: as above  Neurologic: no numbness, paresthesias  Remainder of review of systems is negative including constitutional, CV, pulmonary, GI, except as noted in HPI or medical history.    Patient's current problem list, past medical and surgical history, and family history were reviewed.    Patient Active Problem List   Diagnosis     CARDIOVASCULAR SCREENING; LDL GOAL LESS THAN 160     No past medical history on file.  No past surgical history on file.  Family History   Problem Relation Age of Onset     C.A.D. Father      CABG     Lipids Father      Respiratory Maternal Grandfather      lung problem.     Diabetes Paternal Grandfather      Hypertension Paternal Grandfather          Objective  /74 (BP Location: Left arm, Patient Position: Chair, Cuff Size: Adult Regular)  Ht 6' (1.829  m)  Wt 212 lb (96.2 kg)  BMI 28.75 kg/m2    GENERAL APPEARANCE: healthy, alert and no distress   GAIT: NORMAL  SKIN: no suspicious lesions or rashes  HEENT: Sclera clear, anicteric  CV: good peripheral pulses  RESP: Breathing not labored  NEURO: Normal strength and tone, mentation intact and speech normal  PSYCH:  mentation appears normal and affect normal/bright    Bilateral Knee exam    Inspection:      moderate effusion right    Patella:      Mobility -       normal bilateral    Tender:      medial patellar border right       lateral patellar border right       medial joint line right       lateral joint line right    Non Tender:      remainder of knee area right  - minimal tenderness left    Knee ROM:      Full active and passive ROM with flexion and extension bilateral    Strength:      5/5 with knee extension bilateral    Special Tests:     positive (+) Hermann right, negative left       neg (-) Lachmans bilateral       neg (-) anterior drawer bilateral       neg (-) posterior drawer bilateral       neg (-) varus at 0 deg and 30 deg bilateral       neg (-) valgus at 0 deg and 30 deg bilateral    Gait:      normal    Neurovascular:      2+ peripheral pulses bilaterally and brisk capillary refill       sensation grossly intact    Radiology  I ordered, visualized and reviewed these images with the patient  3 XR views of bilateral reviewed: no acute bony abnormality, no significant degenerative change  - will follow official read    Assessment:  1. Chronic pain of both knees    2. Acute pain of right knee      Right knee pain and effusion concerning for meniscal tear, will obtain MRI to evaluate.  Discussed other supportive care including rest, ice, elevation, compression and anti-inflammatory medications (I reviewed the mechanism of action as well as risk/benefit profile of medications. Questions answered.).    Left knee pain likely patellofemoral pain syndrome, will likely recommend physical therapy pending  right knee MRI results.    Plan:  - Today's Plan of Care:  Over the counter medication: Acetaminophen (Tylenol) 1000mg every 6 hours with food (Maximum of 3000mg/day)  Naproxen (Aleve) maximum of 440mg two times a day with food  STOP IBUPROFEN  MRI of the right knee. Call [phone] to schedule MRI  Medical Equipment: knee sleeve  Continue with relative rest and activity modification, Ice, Compression, and Elevation.  Can apply ice 10-15 minutes3-4 times per day as needed.    -We also discussed other future treatment options:  Referral to orthopedic surgeon or Rehab: Physical Therapy    Follow Up: In clinic with Dr. Pillai after MRI (wait at least 1-2 days)    Concerning signs and symptoms were reviewed.  The patient expressed understanding of this management plan and all questions were answered at this time.    Zoie Pillai MD CAQ  Primary Care Sports Medicine  [facility]

## 2018-11-16 NOTE — MR AVS SNAPSHOT
After Visit Summary   11/16/2018    Vadim Head    MRN: [id number]           Patient Information     Date Of Birth          1985        Visit Information        Provider Department      11/16/2018 1:40 PM Zoie Pillai MD [facility]        Today's Diagnoses     Bilateral knee pain    -  1    Acute pain of right knee          Care Instructions      Plan:  - Today's Plan of Care:  Over the counter medication: Acetaminophen (Tylenol) 1000mg every 6 hours with food (Maximum of 3000mg/day)  Naproxen (Aleve) maximum of 440mg two times a day with food  STOP IBUPROFEN  MRI of the right knee. Call [phone] to schedule MRI  Medical Equipment: knee sleeve  Continue with relative rest and activity modification, Ice, Compression, and Elevation.  Can apply ice 10-15 minutes3-4 times per day as needed.    -We also discussed other future treatment options:  Referral to orthopedic surgeon or Rehab: Physical Therapy    Follow Up: In clinic with Dr. Pillai after MRI (wait at least 1-2 days)    If you have any further questions for your physician or physician s care team you can call [phone] and use option 3 to leave a voice message. Calls received during business hours will be returned same day.              Follow-ups after your visit        Future tests that were ordered for you today     Open Future Orders        Priority Expected Expires Ordered    MR Knee Right w/o Contrast Routine  11/16/2019 11/16/2018            Who to contact     If you have questions or need follow up information about today's clinic visit or your schedule please contact [facility] directly at [phone].  Normal or non-critical lab and imaging results will be communicated to you by MyChart, letter or phone within 4 business days after the clinic has received the results. If you do not hear from us within 7 days, please contact the clinic through [employer]  or phone. If you have a critical or abnormal lab result, we will notify you by phone as soon as possible.  Submit refill requests through [employer] or call your pharmacy and they will forward the refill request to us. Please allow 3 business days for your refill to be completed.          Additional Information About Your Visit        Care EveryWhere ID     This is your Care EveryWhere ID. This could be used by other organizations to access your [facility] medical records  [id number]        Your Vitals Were     Height BMI (Body Mass Index)                6' (1.829 m) 28.75 kg/m2           Blood Pressure from Last 3 Encounters:   11/16/18 121/74   12/13/17 124/79   05/19/17 117/68    Weight from Last 3 Encounters:   11/16/18 212 lb (96.2 kg)   12/13/17 204 lb (92.5 kg)   05/19/17 203 lb 9.6 oz (92.4 kg)               Primary Care Provider Fax #    Physician No Ref-Primary [phone]       No address on file        Equal Access to Services     MEL CARMONA : Hadii aad ku hadasho Soomaali, waaxda luqadaha, qaybta kaalmada adeegyada, waxay laliin haysweetie hickman . So [facility] [phone].    ATENCIÓN: Si habla español, tiene a sosa disposición servicios gratuitos de asistencia lingüística. Andreia al [phone].    We comply with applicable federal civil rights laws and [place] laws. We do not discriminate on the basis of race, color, national origin, age, disability, sex, sexual orientation, or gender identity.            Thank you!     Thank you for choosing [facility]  for your care. Our goal is always to provide you with excellent care. Hearing back from our patients is one way we can continue to improve our services. Please take a few minutes to complete the written survey that you may receive in the mail after your visit with us. Thank you!             Your Updated Medication List - Protect others around you: Learn how to safely use, store and throw away your medicines at  www.disposemymeds.org.          This list is accurate as of 11/16/18  2:29 PM.  Always use your most recent med list.                   Brand Name Dispense Instructions for use Diagnosis    EPINEPHrine 0.3 MG/0.3ML injection 2-pack    EPIPEN/ADRENACLICK/or ANY BX GENERIC EQUIV    1 each    Inject 0.3 mLs (0.3 mg) into the muscle once as needed for anaphylaxis        ibuprofen 600 MG tablet    ADVIL/MOTRIN    40 tablet    Take 1 tablet (600 mg) by mouth every 6 hours    Knee pain, left       NO ACTIVE MEDICATIONS           OMEGA 3 PO           sildenafil 25 MG tablet    VIAGRA    12 tablet    Take 1 tablet (25 mg) by mouth daily as needed 30 min to 4 hrs before sex. Do not use with nitroglycerin, terazosin or doxazosin.    Erectile dysfunction, unspecified erectile dysfunction type

## 2018-11-19 ENCOUNTER — HOSPITAL ENCOUNTER (OUTPATIENT)
Dept: MRI IMAGING | Facility: CLINIC | Age: 33
Discharge: HOME | End: 2018-11-19
Attending: PEDIATRICS | Admitting: PEDIATRICS
Payer: COMMERCIAL

## 2018-11-19 DIAGNOSIS — M25.561 ACUTE PAIN OF RIGHT KNEE: ICD-10-CM

## 2018-11-19 PROCEDURE — 73721 MRI JNT OF LWR EXTRE W/O DYE: CPT | Mod: RT

## 2018-11-30 ENCOUNTER — TELEPHONE (OUTPATIENT)
Dept: ORTHOPEDICS | Facility: CLINIC | Age: 33
End: 2018-11-30

## 2018-11-30 NOTE — TELEPHONE ENCOUNTER
Please call patient with MRI results:    MR KNEE RIGHT WITHOUT CONTRAST November 19, 2018 11:55 AM     HISTORY: Three months of right knee pain. Evaluate for meniscal tear.     COMPARISON: Radiographs on 11/16/2018.     TECHNIQUE: Multiplanar MR imaging was performed without contrast.     FINDINGS:      Medial Meniscus: No tear, displaced fragment, or extrusion.     Lateral Meniscus: No tear, displaced fragment, or extrusion.     Anterior Cruciate Ligament: Unremarkable.     Posterior Cruciate Ligament: Unremarkable.     Medial Collateral Ligament: Unremarkable.     Lateral Collateral Ligament Complex, Popliteus Tendon: The iliotibial  band, fibular collateral ligament, biceps femoris tendon, and  popliteus tendon are unremarkable.     Osseous and Cartilaginous Structures: No fracture or osseous lesion is  demonstrated. No abnormal marrow signal intensity is identified. The  cartilage surfaces are well preserved.     Extensor Mechanism: The quadriceps and patellar tendons are  unremarkable. The medial and lateral patellar retinacula appear  unremarkable.     Joint Space: No joint effusion. No definite loose bodies appreciated.     Additional Findings: No Baker's cyst. No semimembranosus-tibial  collateral ligament or pes anserine bursitis. No soft tissue pathology  is seen.         IMPRESSION: Unremarkable MRI of the right knee. Specifically, no  meniscus tear is seen.       In Summary:  - Normal MRI, no meniscal tear    I Recommend:  - Clinic follow up to review results and discuss next steps in treatment, likely PT    Zoie Pillai MD

## 2018-12-21 ENCOUNTER — TELEPHONE (OUTPATIENT)
Dept: ORTHOPEDICS | Facility: CLINIC | Age: 33
End: 2018-12-21

## 2018-12-21 NOTE — LETTER
December 21, 2018      Vadmi Head  [address] [facility] [address]        Dear ,    We are writing to inform you of the results of your right knee MRI completed on 11/19/18.  We have attempted to contact you several times without success.  Yours are noted below.    Results for orders placed or performed during the hospital encounter of 11/19/18   MR Knee Right w/o Contrast    Narrative    MR KNEE RIGHT WITHOUT CONTRAST November 19, 2018 11:55 AM    HISTORY: Three months of right knee pain. Evaluate for meniscal tear.    COMPARISON: Radiographs on 11/16/2018.    TECHNIQUE: Multiplanar MR imaging was performed without contrast.    FINDINGS:     Medial Meniscus: No tear, displaced fragment, or extrusion.    Lateral Meniscus: No tear, displaced fragment, or extrusion.    Anterior Cruciate Ligament: Unremarkable.    Posterior Cruciate Ligament: Unremarkable.    Medial Collateral Ligament: Unremarkable.    Lateral Collateral Ligament Complex, Popliteus Tendon: The iliotibial  band, fibular collateral ligament, biceps femoris tendon, and  popliteus tendon are unremarkable.    Osseous and Cartilaginous Structures: No fracture or osseous lesion is  demonstrated. No abnormal marrow signal intensity is identified. The  cartilage surfaces are well preserved.    Extensor Mechanism: The quadriceps and patellar tendons are  unremarkable. The medial and lateral patellar retinacula appear  unremarkable.    Joint Space: No joint effusion. No definite loose bodies appreciated.    Additional Findings: No Baker's cyst. No semimembranosus-tibial  collateral ligament or pes anserine bursitis. No soft tissue pathology  is seen.      Impression    IMPRESSION: Unremarkable MRI of the right knee. Specifically, no  meniscus tear is seen.    NEGRITO MCNEIL MD     Please follow up in clinic to review these results further and discuss treatment options.  If you have any questions or concerns, please call the clinic at  the number listed above.       Sincerely,            Zoie Pillai MD

## 2019-11-25 ENCOUNTER — ANCILLARY PROCEDURE (OUTPATIENT)
Dept: GENERAL RADIOLOGY | Facility: CLINIC | Age: 34
End: 2019-11-25
Attending: FAMILY MEDICINE
Payer: COMMERCIAL

## 2019-11-25 ENCOUNTER — OFFICE VISIT (OUTPATIENT)
Dept: FAMILY MEDICINE | Facility: CLINIC | Age: 34
End: 2019-11-25
Payer: COMMERCIAL

## 2019-11-25 VITALS
HEIGHT: 71 IN | RESPIRATION RATE: 12 BRPM | OXYGEN SATURATION: 97 % | DIASTOLIC BLOOD PRESSURE: 66 MMHG | WEIGHT: 194 LBS | BODY MASS INDEX: 27.16 KG/M2 | SYSTOLIC BLOOD PRESSURE: 124 MMHG | TEMPERATURE: 99 F | HEART RATE: 76 BPM

## 2019-11-25 DIAGNOSIS — R05.9 COUGH: ICD-10-CM

## 2019-11-25 DIAGNOSIS — R05.9 COUGH: Primary | ICD-10-CM

## 2019-11-25 LAB
FLUAV+FLUBV AG SPEC QL: NEGATIVE
FLUAV+FLUBV AG SPEC QL: NEGATIVE
SPECIMEN SOURCE: NORMAL

## 2019-11-25 PROCEDURE — 71046 X-RAY EXAM CHEST 2 VIEWS: CPT

## 2019-11-25 PROCEDURE — 87804 INFLUENZA ASSAY W/OPTIC: CPT | Performed by: FAMILY MEDICINE

## 2019-11-25 PROCEDURE — 99213 OFFICE O/P EST LOW 20 MIN: CPT | Performed by: FAMILY MEDICINE

## 2019-11-25 RX ORDER — AZITHROMYCIN 250 MG/1
TABLET, FILM COATED ORAL
Qty: 6 TABLET | Refills: 1 | Status: SHIPPED | OUTPATIENT
Start: 2019-11-25 | End: 2020-01-23

## 2019-11-25 ASSESSMENT — MIFFLIN-ST. JEOR: SCORE: 1832.11

## 2019-11-25 NOTE — PROGRESS NOTES
SUBJECTIVE:                                                    Vadim Head is 34 year old male   Chief Complaint   Patient presents with     URI     Symptoms for 2 days. States productive cough, left sided chest pain, green phlegm (some blood in phlegm last night), headache, sweats/chills at night. Has tried advil/tylenol (last dose 9 pm last night), states not helpful.      Problem list and histories reviewed & adjusted, as indicated.  Additional history: working outside in cold past 3 days all day [employer] [facility] close to [place].  Work is [employer].  Does not smoke or vape.     Patient Active Problem List   Diagnosis     CARDIOVASCULAR SCREENING; LDL GOAL LESS THAN 160     History reviewed. No pertinent surgical history.    Social History     Tobacco Use     Smoking status: Never Smoker     Smokeless tobacco: Never Used   Substance Use Topics     Alcohol use: Yes     Comment: one drink per night     Family History   Problem Relation Age of Onset     C.A.D. Father         CABG     Lipids Father      Respiratory Maternal Grandfather         lung problem.     Diabetes Paternal Grandfather      Hypertension Paternal Grandfather          Current Outpatient Medications   Medication Sig Dispense Refill     EPINEPHrine (EPIPEN) 0.3 MG/0.3ML injection Inject 0.3 mLs (0.3 mg) into the muscle once as needed for anaphylaxis (Patient not taking: Reported on 12/13/2017) 1 each 0     ibuprofen (ADVIL,MOTRIN) 600 MG tablet Take 1 tablet (600 mg) by mouth every 6 hours (Patient not taking: Reported on 11/16/2018) 40 tablet 0     NO ACTIVE MEDICATIONS        Omega-3 Fatty Acids (OMEGA 3 PO)        sildenafil (VIAGRA) 25 MG tablet Take 1 tablet (25 mg) by mouth daily as needed 30 min to 4 hrs before sex. Do not use with nitroglycerin, terazosin or doxazosin. (Patient not taking: Reported on 11/25/2019) 12 tablet 11     No Known Allergies  Recent Labs   Lab Test 12/14/17  0812 04/05/14  0330 07/23/12  1207   A1C 4.9  " --   --    LDL 73  --   --    HDL 56  --   --    TRIG 57  --   --    ALT  --  48 33   CR  --  1.02 0.93   GFRESTIMATED  --  86 >90   GFRESTBLACK  --  >90 >90   POTASSIUM  --  4.1 4.3   TSH 0.81  --  0.54      BP Readings from Last 3 Encounters:   11/25/19 124/66   11/16/18 121/74   12/13/17 124/79    Wt Readings from Last 3 Encounters:   11/25/19 88 kg (194 lb)   11/16/18 96.2 kg (212 lb)   12/13/17 92.5 kg (204 lb)         ROS:  Constitutional, HEENT, cardiovascular, pulmonary, gi and gu systems are negative, except as otherwise noted.    OBJECTIVE:                                                    /66   Pulse 76   Temp 99  F (37.2  C) (Tympanic)   Resp 12   Ht 1.803 m (5' 11\")   Wt 88 kg (194 lb)   SpO2 97%   BMI 27.06 kg/m     GENERAL APPEARANCE ADULT: alert, appears ill, no distress  HENT: right TM abnormal, dull, left TM normal, throat/mouth:mild erythema, moderate erythema, mucous membranes moist  RESP: lungs clear to auscultation , no respiratory distress.  Chest wall without pain on compression   ABDOMEN: soft, nontender, without hepatosplenomegaly or masses  CV: normal rate, regular rhythm, no murmur or gallop  PSYCH: mentation appears normal., affect and mood normal  Diagnostic Test Results:  Chest xray:  Streaky lung markings left  Results for orders placed or performed in visit on 11/25/19   Influenza A/B antigen     Status: None   Result Value Ref Range    Influenza A/B Agn Specimen Nasal     Influenza A Negative NEG^Negative    Influenza B Negative NEG^Negative          ASSESSMENT/PLAN:                                                    1. Cough  Possible pneumonia, Viral vs bacterial.  Trial of antibiotics but if not effective viral infection and self limiting.  If effective and recurs will repeat, see patient instructions./  - Influenza A/B antigen  - XR Chest 2 Views; Future    Wendi Horowitz MD  [facility]    "

## 2019-11-25 NOTE — NURSING NOTE
"Initial /66   Pulse 76   Temp 99  F (37.2  C) (Tympanic)   Resp 12   Ht 1.803 m (5' 11\")   Wt 88 kg (194 lb)   SpO2 97%   BMI 27.06 kg/m   Estimated body mass index is 27.06 kg/m  as calculated from the following:    Height as of this encounter: 1.803 m (5' 11\").    Weight as of this encounter: 88 kg (194 lb). .      "

## 2019-12-19 ENCOUNTER — OFFICE VISIT (OUTPATIENT)
Dept: FAMILY MEDICINE | Facility: CLINIC | Age: 34
End: 2019-12-19
Payer: COMMERCIAL

## 2019-12-19 VITALS
SYSTOLIC BLOOD PRESSURE: 124 MMHG | WEIGHT: 197.2 LBS | DIASTOLIC BLOOD PRESSURE: 76 MMHG | TEMPERATURE: 97.7 F | BODY MASS INDEX: 27.5 KG/M2 | HEART RATE: 75 BPM | OXYGEN SATURATION: 98 %

## 2019-12-19 DIAGNOSIS — F32.0 CURRENT MILD EPISODE OF MAJOR DEPRESSIVE DISORDER WITHOUT PRIOR EPISODE (H): ICD-10-CM

## 2019-12-19 DIAGNOSIS — F41.1 GAD (GENERALIZED ANXIETY DISORDER): Primary | ICD-10-CM

## 2019-12-19 PROCEDURE — 99203 OFFICE O/P NEW LOW 30 MIN: CPT | Performed by: INTERNAL MEDICINE

## 2019-12-19 RX ORDER — PROPRANOLOL HYDROCHLORIDE 20 MG/1
20 TABLET ORAL 3 TIMES DAILY PRN
Qty: 60 TABLET | Refills: 3 | Status: SHIPPED | OUTPATIENT
Start: 2019-12-19 | End: 2021-05-04

## 2019-12-19 ASSESSMENT — ANXIETY QUESTIONNAIRES
3. WORRYING TOO MUCH ABOUT DIFFERENT THINGS: MORE THAN HALF THE DAYS
5. BEING SO RESTLESS THAT IT IS HARD TO SIT STILL: NOT AT ALL
7. FEELING AFRAID AS IF SOMETHING AWFUL MIGHT HAPPEN: NOT AT ALL
5. BEING SO RESTLESS THAT IT IS HARD TO SIT STILL: NOT AT ALL
IF YOU CHECKED OFF ANY PROBLEMS ON THIS QUESTIONNAIRE, HOW DIFFICULT HAVE THESE PROBLEMS MADE IT FOR YOU TO DO YOUR WORK, TAKE CARE OF THINGS AT HOME, OR GET ALONG WITH OTHER PEOPLE: VERY DIFFICULT
6. BECOMING EASILY ANNOYED OR IRRITABLE: MORE THAN HALF THE DAYS
3. WORRYING TOO MUCH ABOUT DIFFERENT THINGS: MORE THAN HALF THE DAYS
1. FEELING NERVOUS, ANXIOUS, OR ON EDGE: MORE THAN HALF THE DAYS
GAD7 TOTAL SCORE: 9
2. NOT BEING ABLE TO STOP OR CONTROL WORRYING: MORE THAN HALF THE DAYS
1. FEELING NERVOUS, ANXIOUS, OR ON EDGE: MORE THAN HALF THE DAYS
6. BECOMING EASILY ANNOYED OR IRRITABLE: MORE THAN HALF THE DAYS
2. NOT BEING ABLE TO STOP OR CONTROL WORRYING: MORE THAN HALF THE DAYS
IF YOU CHECKED OFF ANY PROBLEMS ON THIS QUESTIONNAIRE, HOW DIFFICULT HAVE THESE PROBLEMS MADE IT FOR YOU TO DO YOUR WORK, TAKE CARE OF THINGS AT HOME, OR GET ALONG WITH OTHER PEOPLE: VERY DIFFICULT

## 2019-12-19 ASSESSMENT — PATIENT HEALTH QUESTIONNAIRE - PHQ9
SUM OF ALL RESPONSES TO PHQ QUESTIONS 1-9: 10
5. POOR APPETITE OR OVEREATING: SEVERAL DAYS
5. POOR APPETITE OR OVEREATING: SEVERAL DAYS

## 2019-12-19 NOTE — PROGRESS NOTES
"Subjective     Vadim Head is a 34 year old male who presents to clinic today for the following health issues:  Chief Complaint   Patient presents with     Anxiety     x 3 years      Depression       HPI   Abnormal Mood Symptoms  Onset: x 3 years, symptoms worsening over last 1.5 month     Description:   Depression: YES- mildly  Anxiety: YES- does get anxiety attacks during arguments, gets chest tightness and stressed.  Can last 15-30 minutes, does deep breathing to help    Accompanying Signs & Symptoms:  Still participating in activities that you used to enjoy: YES- \"kind of, mostly, a little harder\"  Fatigue: YES- a little bit  Irritability: YES- distressed.   Difficulty concentrating: YES- gets \"scatter brainy\"  Changes in appetite: YES- fluctuates quite a bit.   Use to be over 200 lbs, down to 185 lbs now at 197 lbs  Problems with sleep: YES- on/off  Heart racing/beating fast : YES  Thoughts of hurting yourself or others: none    History:   Recent stress: no, nothing really new, marriage problems.   Prior depression hospitalization: None  Family history of depression: paternal uncle- on a medication that is helpful but he isn't sure what it is  Family history of anxiety: no    Precipitating factors:   Alcohol/drug use: no    Alleviating factors:    Therapies Tried and outcome: He is seeing a marriage therapist regularly, been going to this particular therapist for about 2 years    PHQ-9 SCORE 12/19/2019   PHQ-9 Total Score 10     JORDON-7 SCORE 12/19/2019   Total Score 9                 Patient Active Problem List   Diagnosis     CARDIOVASCULAR SCREENING; LDL GOAL LESS THAN 160     History reviewed. No pertinent surgical history.    Social History     Tobacco Use     Smoking status: Never Smoker     Smokeless tobacco: Never Used   Substance Use Topics     Alcohol use: Yes     Comment: one drink per night     Family History   Problem Relation Age of Onset     C.A.D. Father         CABG     Lipids Father      " Respiratory Maternal Grandfather         lung problem.     Diabetes Paternal Grandfather      Hypertension Paternal Grandfather          Current Outpatient Medications   Medication Sig Dispense Refill     propranolol (INDERAL) 20 MG tablet Take 1 tablet (20 mg) by mouth 3 times daily as needed (anxiety) 60 tablet 3     azithromycin (ZITHROMAX) 250 MG tablet Two tablets first day, then one tablet daily for four days. 6 tablet 1     EPINEPHrine (EPIPEN) 0.3 MG/0.3ML injection Inject 0.3 mLs (0.3 mg) into the muscle once as needed for anaphylaxis (Patient not taking: Reported on 12/13/2017) 1 each 0     ibuprofen (ADVIL,MOTRIN) 600 MG tablet Take 1 tablet (600 mg) by mouth every 6 hours (Patient not taking: Reported on 11/16/2018) 40 tablet 0     NO ACTIVE MEDICATIONS        Omega-3 Fatty Acids (OMEGA 3 PO)        sildenafil (VIAGRA) 25 MG tablet Take 1 tablet (25 mg) by mouth daily as needed 30 min to 4 hrs before sex. Do not use with nitroglycerin, terazosin or doxazosin. (Patient not taking: Reported on 11/25/2019) 12 tablet 11     No Known Allergies      Reviewed and updated as needed this visit by Provider         Review of Systems   ROS COMP: Constitutional, psych systems are negative, except as otherwise noted.      Objective    /76 (BP Location: Right arm, Patient Position: Sitting, Cuff Size: Adult Regular)   Pulse 75   Temp 97.7  F (36.5  C) (Tympanic)   Wt 89.4 kg (197 lb 3.2 oz)   SpO2 98%   BMI 27.50 kg/m    Body mass index is 27.5 kg/m .  Physical Exam   GENERAL: healthy, alert and no distress  PSYCH: mentation appears normal and affect flat          Assessment & Plan     1. JORDON (generalized anxiety disorder)  and  2. Current mild episode of major depressive disorder without prior episode (H)    Vadim has been struggling with symptoms of both depression and anxiety for a few years, but this has been worsening recently due to marital stress.  They are seeing a marriage counselor.  He presents  today to discuss medication options, but it somewhat on the fence about trying one.  I did recommend daily medication since he has rather persistent symptoms, but he is not really interested in daily medication at this point and would like to just try a prn first.  Advised him that I try to avoid benzos as first line medication due to addiction potential and typically recommend other meds to try first.  Suggested hydroxyzine but he gets very sedated from Benadryl, so he didn't want to try this one.  Will instead try propranolol.  Can titrate dose up as needed/tolerated.  Advised him to follow-up if this is not helping or he wants to consider daily medication.     - propranolol (INDERAL) 20 MG tablet; Take 1 tablet (20 mg) by mouth 3 times daily as needed (anxiety)  Dispense: 60 tablet; Refill: 3       Return in about 1 month (around 1/19/2020), or if symptoms worsen or fail to improve.    Odell Montemayor MD  [facility]

## 2019-12-20 ASSESSMENT — ANXIETY QUESTIONNAIRES: GAD7 TOTAL SCORE: 9

## 2019-12-23 PROBLEM — F32.0 CURRENT MILD EPISODE OF MAJOR DEPRESSIVE DISORDER WITHOUT PRIOR EPISODE (H): Status: ACTIVE | Noted: 2019-12-23

## 2019-12-23 PROBLEM — F41.1 GAD (GENERALIZED ANXIETY DISORDER): Status: ACTIVE | Noted: 2019-12-23

## 2020-01-23 ENCOUNTER — OFFICE VISIT (OUTPATIENT)
Dept: FAMILY MEDICINE | Facility: CLINIC | Age: 35
End: 2020-01-23
Payer: COMMERCIAL

## 2020-01-23 VITALS
HEIGHT: 71 IN | SYSTOLIC BLOOD PRESSURE: 116 MMHG | OXYGEN SATURATION: 99 % | WEIGHT: 197.6 LBS | DIASTOLIC BLOOD PRESSURE: 74 MMHG | BODY MASS INDEX: 27.66 KG/M2 | TEMPERATURE: 98.5 F | HEART RATE: 63 BPM

## 2020-01-23 DIAGNOSIS — F32.0 CURRENT MILD EPISODE OF MAJOR DEPRESSIVE DISORDER WITHOUT PRIOR EPISODE (H): ICD-10-CM

## 2020-01-23 DIAGNOSIS — F41.1 GAD (GENERALIZED ANXIETY DISORDER): Primary | ICD-10-CM

## 2020-01-23 DIAGNOSIS — N52.9 ERECTILE DYSFUNCTION, UNSPECIFIED ERECTILE DYSFUNCTION TYPE: ICD-10-CM

## 2020-01-23 PROCEDURE — 99214 OFFICE O/P EST MOD 30 MIN: CPT | Performed by: INTERNAL MEDICINE

## 2020-01-23 RX ORDER — SILDENAFIL 25 MG/1
25 TABLET, FILM COATED ORAL DAILY PRN
Qty: 10 TABLET | Refills: 11 | Status: SHIPPED | OUTPATIENT
Start: 2020-01-23 | End: 2021-11-23

## 2020-01-23 RX ORDER — ESCITALOPRAM OXALATE 10 MG/1
10 TABLET ORAL DAILY
Qty: 90 TABLET | Refills: 0 | Status: SHIPPED | OUTPATIENT
Start: 2020-01-23 | End: 2020-02-12

## 2020-01-23 ASSESSMENT — ANXIETY QUESTIONNAIRES
2. NOT BEING ABLE TO STOP OR CONTROL WORRYING: MORE THAN HALF THE DAYS
5. BEING SO RESTLESS THAT IT IS HARD TO SIT STILL: NOT AT ALL
IF YOU CHECKED OFF ANY PROBLEMS ON THIS QUESTIONNAIRE, HOW DIFFICULT HAVE THESE PROBLEMS MADE IT FOR YOU TO DO YOUR WORK, TAKE CARE OF THINGS AT HOME, OR GET ALONG WITH OTHER PEOPLE: SOMEWHAT DIFFICULT
2. NOT BEING ABLE TO STOP OR CONTROL WORRYING: MORE THAN HALF THE DAYS
5. BEING SO RESTLESS THAT IT IS HARD TO SIT STILL: NOT AT ALL
GAD7 TOTAL SCORE: 9
IF YOU CHECKED OFF ANY PROBLEMS ON THIS QUESTIONNAIRE, HOW DIFFICULT HAVE THESE PROBLEMS MADE IT FOR YOU TO DO YOUR WORK, TAKE CARE OF THINGS AT HOME, OR GET ALONG WITH OTHER PEOPLE: SOMEWHAT DIFFICULT
1. FEELING NERVOUS, ANXIOUS, OR ON EDGE: MORE THAN HALF THE DAYS
GAD7 TOTAL SCORE: 9
3. WORRYING TOO MUCH ABOUT DIFFERENT THINGS: SEVERAL DAYS
6. BECOMING EASILY ANNOYED OR IRRITABLE: SEVERAL DAYS
3. WORRYING TOO MUCH ABOUT DIFFERENT THINGS: SEVERAL DAYS
1. FEELING NERVOUS, ANXIOUS, OR ON EDGE: MORE THAN HALF THE DAYS
7. FEELING AFRAID AS IF SOMETHING AWFUL MIGHT HAPPEN: SEVERAL DAYS
7. FEELING AFRAID AS IF SOMETHING AWFUL MIGHT HAPPEN: SEVERAL DAYS
6. BECOMING EASILY ANNOYED OR IRRITABLE: SEVERAL DAYS

## 2020-01-23 ASSESSMENT — PATIENT HEALTH QUESTIONNAIRE - PHQ9
SUM OF ALL RESPONSES TO PHQ QUESTIONS 1-9: 10
5. POOR APPETITE OR OVEREATING: MORE THAN HALF THE DAYS
5. POOR APPETITE OR OVEREATING: MORE THAN HALF THE DAYS

## 2020-01-23 ASSESSMENT — MIFFLIN-ST. JEOR: SCORE: 1844.47

## 2020-01-23 NOTE — PROGRESS NOTES
"Subjective     Vadim Head is a 34 year old male who presents to clinic today for the following health issues:  Chief Complaint   Patient presents with     Anxiety     Depression     Imm/Inj     flu vaccine deferred       HPI     I saw Vadim on 12/19: \"Vadim has been struggling with symptoms of both depression and anxiety for a few years, but this has been worsening recently due to marital stress.  They are seeing a marriage counselor.  He presents today to discuss medication options, but it somewhat on the fence about trying one.  I did recommend daily medication since he has rather persistent symptoms, but he is not really interested in daily medication at this point and would like to just try a prn first.  Advised him that I try to avoid benzos as first line medication due to addiction potential and typically recommend other meds to try first.  Suggested hydroxyzine but he gets very sedated from Benadryl, so he didn't want to try this one.  Will instead try propranolol.  Can titrate dose up as needed/tolerated.  Advised him to follow-up if this is not helping or he wants to consider daily medication.\"    Vadim states that he has been taking the propranolol as needed and it does seem to help.  He has been able to learn and anticipate his triggers.  He has noticed that his motivation at work seems lower and he is feeling more tired.      Uncle takes citalopram and friend is taking escitalopram.      Depression and Anxiety Follow-Up    How are you doing with your depression since your last visit? Worsened a bit    How are you doing with your anxiety since your last visit?  Not sure if symptoms are from anxiety but feels more depression     Are you having other symptoms that might be associated with depression or anxiety? No    Have you had a significant life event? Relationship Concerns and OTHER: all marriage related.      Do you have any concerns with your use of alcohol or other drugs? No    Social " History     Tobacco Use     Smoking status: Never Smoker     Smokeless tobacco: Never Used   Substance Use Topics     Alcohol use: Yes     Comment: one drink per night     Drug use: No     PHQ 12/19/2019 1/23/2020 1/23/2020   PHQ-9 Total Score 10 - 10   Q9: Thoughts of better off dead/self-harm past 2 weeks Not at all Not at all Not at all     JORDON-7 SCORE 12/19/2019 1/23/2020 1/23/2020   Total Score 9 9 9       Suicide Assessment Five-step Evaluation and Treatment (SAFE-T)      How many servings of fruits and vegetables do you eat daily?  Varies from day today, but typically 1 - 2    On average, how many sweetened beverages do you drink each day (Examples: soda, juice, sweet tea, etc.  Do NOT count diet or artificially sweetened beverages)?   1    How many days per week do you exercise enough to make your heart beat faster? 5    How many minutes a day do you exercise enough to make your heart beat faster? 30 - 60    How many days per week do you miss taking your medication?         Patient Active Problem List   Diagnosis     CARDIOVASCULAR SCREENING; LDL GOAL LESS THAN 160     JORDON (generalized anxiety disorder)     Current mild episode of major depressive disorder without prior episode (H)     History reviewed. No pertinent surgical history.    Social History     Tobacco Use     Smoking status: Never Smoker     Smokeless tobacco: Never Used   Substance Use Topics     Alcohol use: Yes     Comment: one drink per night     Family History   Problem Relation Age of Onset     C.A.D. Father         CABG     Lipids Father      Respiratory Maternal Grandfather         lung problem.     Diabetes Paternal Grandfather      Hypertension Paternal Grandfather          Current Outpatient Medications   Medication Sig Dispense Refill     Amino Acids (L-CARNITINE PO)        Dietary Management Product (NEOKE BCAA4) POWD        escitalopram (LEXAPRO) 10 MG tablet Take 1 tablet (10 mg) by mouth daily 90 tablet 0      "Glucos-Chondroit-Hyaluron-MSM (GLUCOSAMINE CHONDROITIN JOINT PO)        propranolol (INDERAL) 20 MG tablet Take 1 tablet (20 mg) by mouth 3 times daily as needed (anxiety) 60 tablet 3     sildenafil (VIAGRA) 25 MG tablet Take 1 tablet (25 mg) by mouth daily as needed 30 min to 4 hrs before sex. Do not use with nitroglycerin, terazosin or doxazosin. 10 tablet 11     EPINEPHrine (EPIPEN) 0.3 MG/0.3ML injection Inject 0.3 mLs (0.3 mg) into the muscle once as needed for anaphylaxis (Patient not taking: Reported on 12/13/2017) 1 each 0     ibuprofen (ADVIL,MOTRIN) 600 MG tablet Take 1 tablet (600 mg) by mouth every 6 hours (Patient not taking: Reported on 11/16/2018) 40 tablet 0     NO ACTIVE MEDICATIONS        Omega-3 Fatty Acids (OMEGA 3 PO)        No Known Allergies    Reviewed and updated as needed this visit by Provider         Review of Systems   ROS COMP: Constitutional, psych systems are negative, except as otherwise noted.      Objective    /74 (BP Location: Right arm, Patient Position: Sitting, Cuff Size: Adult Regular)   Pulse 63   Temp 98.5  F (36.9  C) (Tympanic)   Ht 1.797 m (5' 10.75\")   Wt 89.6 kg (197 lb 9.6 oz)   SpO2 99%   BMI 27.75 kg/m    Body mass index is 27.75 kg/m .  Physical Exam   GENERAL: healthy, alert and no distress  PSYCH: mentation appears normal and affect somewhat flat            Assessment & Plan     1. JORDON (generalized anxiety disorder)  and  2. Current mild episode of major depressive disorder without prior episode (H)    Vadim feels that his depression symptoms have worsened some since last visit and would now like to try a daily medication.  We reviewed various options and possible side effects.  He would like to try escitalopram since his uncle and friend have done well on citalopram/escitalopram, though he is concerned about the possible sexual side effects, so he would like the Viagra refilled.  The prn propranolol is helping.  Follow-up in 6 weeks if not improving " (can be a telephone encounter) or earlier if side effects develop.      - escitalopram (LEXAPRO) 10 MG tablet; Take 1 tablet (10 mg) by mouth daily  Dispense: 90 tablet; Refill: 0    3. Erectile dysfunction, unspecified erectile dysfunction type    - sildenafil (VIAGRA) 25 MG tablet; Take 1 tablet (25 mg) by mouth daily as needed 30 min to 4 hrs before sex. Do not use with nitroglycerin, terazosin or doxazosin.  Dispense: 10 tablet; Refill: 11       Return in about 6 weeks (around 3/5/2020) for telephone appointment if not improving.    Odell Montemayor MD  [facility]

## 2020-01-24 ASSESSMENT — ANXIETY QUESTIONNAIRES: GAD7 TOTAL SCORE: 9

## 2020-02-12 ENCOUNTER — TELEPHONE (OUTPATIENT)
Dept: FAMILY MEDICINE | Facility: CLINIC | Age: 35
End: 2020-02-12

## 2020-02-12 DIAGNOSIS — F41.1 GAD (GENERALIZED ANXIETY DISORDER): ICD-10-CM

## 2020-02-12 DIAGNOSIS — F32.0 CURRENT MILD EPISODE OF MAJOR DEPRESSIVE DISORDER WITHOUT PRIOR EPISODE (H): ICD-10-CM

## 2020-02-12 RX ORDER — CITALOPRAM HYDROBROMIDE 20 MG/1
TABLET ORAL
Qty: 30 TABLET | Refills: 1 | Status: SHIPPED | OUTPATIENT
Start: 2020-02-12 | End: 2020-02-24

## 2020-02-12 NOTE — TELEPHONE ENCOUNTER
Reason for Call:  Other prescription    Detailed comments: Pt is calling on the prescription of Lexapro and stating that he has gotten terrible headaches and has stopped this medication yesterday.  Would like a replacement   Phar. Is [facility] in [place].      Phone Number Patient can be reached at: Home number on file [phone] (home)    Best Time: any    Can we leave a detailed message on this number? YES    Call taken on 2/12/2020 at 9:16 AM by Caryl Gamboa

## 2020-02-12 NOTE — TELEPHONE ENCOUNTER
Dr. Montemayor,    Patient is calling about side effects from lexapro.  He has been experiencing headaches about 5 days.  They are increasing in strength.  Right now headache is 3/10. By evening its like 8/10.  Trying tylenol without much relief.  Patient stopped taking lexapro yesterday as he can not take HA. Patient states he is well hydrated.  I did attempt to schedule a telephone visit as per last dictation without success. Caryl CEJA RN

## 2020-02-12 NOTE — TELEPHONE ENCOUNTER
We can try the citalopram instead- I sent over the prescription for [facility].  Recheck in 6 weeks or call earlier if side effects.    Thanks,  Odell Montemayor MD

## 2020-02-12 NOTE — TELEPHONE ENCOUNTER
"Patient notified. Patient verbalized understanding. I asked if he wanted to schedule follow up appt now and he stated: \"I'll just call back\" Katherine Hyde RN    "

## 2020-02-13 NOTE — TELEPHONE ENCOUNTER
Reason for call:  Patient reporting a symptom    Symptom or request: Pt states that he spoke with a nurse yesterday about his ongoing headaches and he is calling back today for an Rx for his headache pain.  Please call patient and advise.      Duration (how long have symptoms been present): ongoing    Have you been treated for this before? Yes    Additional comments:     Phone Number patient can be reached at:  Home number on file [phone] (home)    Best Time:  any    Can we leave a detailed message on this number:  YES    Call taken on 2/13/2020 at 9:53 AM by Jenny Brown

## 2020-02-13 NOTE — TELEPHONE ENCOUNTER
"Dr. Montemayor,    [facility]-    This patient calls back and [place].  States he thinks his neck feels weird and he is having these headaches.  I have advised him to go to UC/ER to be evaluated as he needs more of a work up for this.  He wanted something for his headache and I have explained that if tylenol and or IB have not helped he would need to be seen for evaluation for something stronger as per a Federal Law.  Patient is hesitant stating \"the last time I did saw them it was $2500 and treated me like what are you doing here\". The patient did mention that his cousin works for the [employer] and she is way up there.  His cousin told him that if he got a headache from the lexapro it would be in the first day or so and not last this long.  Caryl CEJA RN    "

## 2020-02-13 NOTE — TELEPHONE ENCOUNTER
Agree with trying to stick with over the counters for the headache or going to ER if he feels there is any more concerning etiology.  Has he tried using both an NSAID and Tylenol together?  Here are my usual instructions for OTCs:    Ibuprofen and Acetaminophen can be alternated for pain.  You can also try Naproxen in place of Ibuprofen (do not use these together).  Here are the max dosing for these medications:    Acetaminophen (Tylenol) 1000mg every 6 hours with food (Maximum of 4000mg/day) AND  Ibuprofen (Advil) maximum of 800mg three times a day with food OR  Naproxen (Aleve) maximum of 440mg two times a day with food.      Agree he should be seen if he is not finding these medications sufficient.    Thanks,  Odell Montemayor MD

## 2020-02-24 ENCOUNTER — ANCILLARY PROCEDURE (OUTPATIENT)
Dept: GENERAL RADIOLOGY | Facility: CLINIC | Age: 35
End: 2020-02-24
Attending: NURSE PRACTITIONER
Payer: COMMERCIAL

## 2020-02-24 ENCOUNTER — OFFICE VISIT (OUTPATIENT)
Dept: FAMILY MEDICINE | Facility: CLINIC | Age: 35
End: 2020-02-24
Payer: COMMERCIAL

## 2020-02-24 VITALS
SYSTOLIC BLOOD PRESSURE: 122 MMHG | TEMPERATURE: 97.2 F | RESPIRATION RATE: 16 BRPM | HEART RATE: 72 BPM | HEIGHT: 71 IN | OXYGEN SATURATION: 97 % | WEIGHT: 201.2 LBS | BODY MASS INDEX: 28.17 KG/M2 | DIASTOLIC BLOOD PRESSURE: 80 MMHG

## 2020-02-24 DIAGNOSIS — M79.671 RIGHT FOOT PAIN: ICD-10-CM

## 2020-02-24 DIAGNOSIS — F32.0 CURRENT MILD EPISODE OF MAJOR DEPRESSIVE DISORDER WITHOUT PRIOR EPISODE (H): ICD-10-CM

## 2020-02-24 DIAGNOSIS — F41.1 GAD (GENERALIZED ANXIETY DISORDER): ICD-10-CM

## 2020-02-24 DIAGNOSIS — G43.911 INTRACTABLE MIGRAINE WITH STATUS MIGRAINOSUS, UNSPECIFIED MIGRAINE TYPE: ICD-10-CM

## 2020-02-24 DIAGNOSIS — M79.671 RIGHT FOOT PAIN: Primary | ICD-10-CM

## 2020-02-24 PROCEDURE — 99214 OFFICE O/P EST MOD 30 MIN: CPT | Performed by: NURSE PRACTITIONER

## 2020-02-24 PROCEDURE — 73630 X-RAY EXAM OF FOOT: CPT | Mod: RT

## 2020-02-24 RX ORDER — RIZATRIPTAN BENZOATE 10 MG/1
10 TABLET ORAL
Qty: 30 TABLET | Refills: 0 | Status: SHIPPED | OUTPATIENT
Start: 2020-02-24 | End: 2021-05-04

## 2020-02-24 RX ORDER — DULOXETIN HYDROCHLORIDE 30 MG/1
30 CAPSULE, DELAYED RELEASE ORAL DAILY
Qty: 90 CAPSULE | Refills: 0 | Status: CANCELLED | OUTPATIENT
Start: 2020-02-24 | End: 2020-05-24

## 2020-02-24 RX ORDER — SUMATRIPTAN 5 MG/1
1 SPRAY NASAL PRN
Qty: 1 EACH | Refills: 1 | Status: CANCELLED | OUTPATIENT
Start: 2020-02-24

## 2020-02-24 RX ORDER — ESCITALOPRAM OXALATE 10 MG/1
TABLET ORAL
COMMUNITY
Start: 2020-01-23 | End: 2020-02-24

## 2020-02-24 ASSESSMENT — PAIN SCALES - GENERAL: PAINLEVEL: NO PAIN (1)

## 2020-02-24 ASSESSMENT — ENCOUNTER SYMPTOMS
RESPIRATORY NEGATIVE: 1
NEUROLOGICAL NEGATIVE: 1
CONSTITUTIONAL NEGATIVE: 1
CARDIOVASCULAR NEGATIVE: 1
PSYCHIATRIC NEGATIVE: 1

## 2020-02-24 ASSESSMENT — MIFFLIN-ST. JEOR: SCORE: 1860.8

## 2020-02-24 NOTE — PROGRESS NOTES
Subjective     Vadim Head is a 34 year old male who presents to clinic today for the following health issues:    HPI   Medication Followup of Lexapro    Taking Medication as prescribed: NO    Side Effects:  Was losing vision, and headaches were increasing    Medication Helping Symptoms:  NO      In addition: Stopped lexapro 2 weeks ago after vision and HA problems. Saw eye doctor who temporarily gave him glasses, which he doesn't need now. Still having some residual HA that worsen as the day goes on.    Depression and Anxiety Follow-Up    How are you doing with your depression since your last visit? No change    How are you doing with your anxiety since your last visit?  No change    Are you having other symptoms that might be associated with depression or anxiety? No    Have you had a significant life event? No     Do you have any concerns with your use of alcohol or other drugs? No    Social History     Tobacco Use     Smoking status: Never Smoker     Smokeless tobacco: Never Used   Substance Use Topics     Alcohol use: Yes     Comment: one drink per night     Drug use: No     PHQ 12/19/2019 1/23/2020 1/23/2020   PHQ-9 Total Score 10 - 10   Q9: Thoughts of better off dead/self-harm past 2 weeks Not at all Not at all Not at all     JORDON-7 SCORE 12/19/2019 1/23/2020 1/23/2020   Total Score 9 9 9         Suicide Assessment Five-step Evaluation and Treatment (SAFE-T)      How many servings of fruits and vegetables do you eat daily?  2-3    On average, how many sweetened beverages do you drink each day (Examples: soda, juice, sweet tea, etc.  Do NOT count diet or artificially sweetened beverages)?   1    How many days per week do you exercise enough to make your heart beat faster? 7    How many minutes a day do you exercise enough to make your heart beat faster? 30 - 60  How many days per week do you miss taking your medication? Not taking at this time    What makes it hard for you to take your medications?  side  effects    Musculoskeletal problem/pain      Duration: This episode has been ongoing since last week Monday after walking around at [employer].    Description  Location: Right foot, great toe    Intensity:  moderate    Accompanying signs and symptoms: swelling and redness initially (3 years ago)    History  Previous similar problem: YES  Previous evaluation:  none    Precipitating or alleviating factors:  Trauma or overuse: YES- three years ago  Aggravating factors include: standing, walking and movement    Therapies tried and outcome: acetaminophen and Ibuprofen    In addition: 3 years ago, hit foot wrong when kick boxing. Symptoms come and go. Symptoms worsened while walking around at [employer] last week. No redness or swelling now. Pain at the base of the right big toe.      Patient Active Problem List   Diagnosis     CARDIOVASCULAR SCREENING; LDL GOAL LESS THAN 160     JORDON (generalized anxiety disorder)     Current mild episode of major depressive disorder without prior episode (H)     History reviewed. No pertinent surgical history.    Social History     Tobacco Use     Smoking status: Never Smoker     Smokeless tobacco: Never Used   Substance Use Topics     Alcohol use: Yes     Comment: one drink per night     Family History   Problem Relation Age of Onset     C.A.D. Father         CABG     Lipids Father      Respiratory Maternal Grandfather         lung problem.     Diabetes Paternal Grandfather      Hypertension Paternal Grandfather          Current Outpatient Medications   Medication Sig Dispense Refill     Amino Acids (L-CARNITINE PO)        Dietary Management Product (NEOKE BCAA4) POWD        Glucos-Chondroit-Hyaluron-MSM (GLUCOSAMINE CHONDROITIN JOINT PO)        ibuprofen (ADVIL,MOTRIN) 600 MG tablet Take 1 tablet (600 mg) by mouth every 6 hours 40 tablet 0     Omega-3 Fatty Acids (OMEGA 3 PO)        rizatriptan (MAXALT) 10 MG tablet Take 1 tablet (10 mg) by mouth at onset of headache for migraine May repeat in  "2 hours. Max 3 tablets/24 hours. 30 tablet 0     EPINEPHrine (EPIPEN) 0.3 MG/0.3ML injection Inject 0.3 mLs (0.3 mg) into the muscle once as needed for anaphylaxis (Patient not taking: Reported on 12/13/2017) 1 each 0     NO ACTIVE MEDICATIONS        propranolol (INDERAL) 20 MG tablet Take 1 tablet (20 mg) by mouth 3 times daily as needed (anxiety) (Patient not taking: Reported on 2/24/2020) 60 tablet 3     sildenafil (VIAGRA) 25 MG tablet Take 1 tablet (25 mg) by mouth daily as needed 30 min to 4 hrs before sex. Do not use with nitroglycerin, terazosin or doxazosin. (Patient not taking: Reported on 2/24/2020) 10 tablet 11     No Known Allergies  BP Readings from Last 3 Encounters:   02/24/20 122/80   01/23/20 116/74   12/19/19 124/76    Wt Readings from Last 3 Encounters:   02/24/20 91.3 kg (201 lb 3.2 oz)   01/23/20 89.6 kg (197 lb 9.6 oz)   12/19/19 89.4 kg (197 lb 3.2 oz)                      Reviewed and updated as needed this visit by Provider         Review of Systems   Constitutional: Negative.    Respiratory: Negative.    Cardiovascular: Negative.    Musculoskeletal:        Right dorsal foot pain at the base of the big toe   Neurological: Negative.    Psychiatric/Behavioral: Negative.  Negative for suicidal ideas.            Objective    /80   Pulse 72   Temp 97.2  F (36.2  C)   Resp 16   Ht 1.797 m (5' 10.75\")   Wt 91.3 kg (201 lb 3.2 oz)   SpO2 97%   BMI 28.26 kg/m    Body mass index is 28.26 kg/m .  Physical Exam  Vitals signs and nursing note reviewed.   Constitutional:       Appearance: Normal appearance. He is not ill-appearing or toxic-appearing.   Cardiovascular:      Rate and Rhythm: Normal rate and regular rhythm.      Pulses: Normal pulses.      Heart sounds: Normal heart sounds.   Pulmonary:      Effort: Pulmonary effort is normal.      Breath sounds: Normal breath sounds.   Musculoskeletal:         General: Tenderness present. No swelling or signs of injury (injury happened 3 years " "ago).      Right lower leg: No edema.      Left lower leg: No edema.      Right foot: Normal range of motion and normal capillary refill. Bony tenderness present. No tenderness, swelling, crepitus, deformity or laceration.        Feet:    Neurological:      Mental Status: He is alert.            Diagnostic Test Results:  Labs reviewed in Epic  No results found for this or any previous visit (from the past 24 hour(s)).        Assessment & Plan     1. Right foot pain  X-ray ordered. Radiology final read: no acute injuries. During exam, no signs of infection or cellulitis. Recommend follow-up with podiatrist to evaluate and treat. In the meantime, advised to take ibuprofen q8h, ice/heat and elevate.     - XR Foot Right G/E 3 Views; Future  - Orthopedic & Spine  Referral    2. Intractable migraine with status migrainosus, unspecified migraine type  Discussed that lexapro has long half-life and a little of the medication could still be in the system causing HA. Discussed medication options for migraines/HA since ibuprofen/tylenol were not working. Maxalt prescribed. Likely will only need temporarily.    - rizatriptan (MAXALT) 10 MG tablet; Take 1 tablet (10 mg) by mouth at onset of headache for migraine May repeat in 2 hours. Max 3 tablets/24 hours.  Dispense: 30 tablet; Refill: 0    3. JORDON (generalized anxiety disorder)  Patient does not want to try any further medication for anxiety due to side effects. Discussed other coping options. Recommended follow-up if he needed further assistance.    4. Current mild episode of major depressive disorder without prior episode (H)  Denies concerns at this time.       BMI:   Estimated body mass index is 28.26 kg/m  as calculated from the following:    Height as of this encounter: 1.797 m (5' 10.75\").    Weight as of this encounter: 91.3 kg (201 lb 3.2 oz).           See Patient Instructions    Return in about 1 week (around 3/2/2020), or if symptoms worsen or fail to " improve.    ALLISON Stubbs [facility]

## 2020-02-24 NOTE — PATIENT INSTRUCTIONS
Migraines/Headaches:  1. Take maxalt as prescribed.    Foot pain:  1. Awaiting foot x-ray results.  2. Will call and discuss results.   3. Referral to podiatry.    Anxiety/depression:  1. It may take a few more days before lexapro is completely out of your system.  2. If you want to try further medication at a later date, please follow-up with PCP.

## 2020-03-09 ENCOUNTER — OFFICE VISIT (OUTPATIENT)
Dept: ORTHOPEDICS | Facility: CLINIC | Age: 35
End: 2020-03-09
Payer: COMMERCIAL

## 2020-03-09 ENCOUNTER — OFFICE VISIT (OUTPATIENT)
Dept: PODIATRY | Facility: CLINIC | Age: 35
End: 2020-03-09
Payer: COMMERCIAL

## 2020-03-09 VITALS
SYSTOLIC BLOOD PRESSURE: 115 MMHG | HEART RATE: 75 BPM | DIASTOLIC BLOOD PRESSURE: 75 MMHG | HEIGHT: 71 IN | BODY MASS INDEX: 27.58 KG/M2 | WEIGHT: 197 LBS

## 2020-03-09 DIAGNOSIS — M25.474 SWELLING OF FIRST METATARSOPHALANGEAL (MTP) JOINT OF RIGHT FOOT: Primary | ICD-10-CM

## 2020-03-09 DIAGNOSIS — M77.51 CAPSULITIS OF METATARSOPHALANGEAL (MTP) JOINT OF RIGHT FOOT: Primary | ICD-10-CM

## 2020-03-09 PROCEDURE — 99203 OFFICE O/P NEW LOW 30 MIN: CPT | Performed by: PODIATRIST

## 2020-03-09 PROCEDURE — 20604 DRAIN/INJ JOINT/BURSA W/US: CPT | Mod: RT | Performed by: FAMILY MEDICINE

## 2020-03-09 RX ADMIN — ROPIVACAINE HYDROCHLORIDE 2 ML: 5 INJECTION, SOLUTION EPIDURAL; INFILTRATION; PERINEURAL at 15:50

## 2020-03-09 RX ADMIN — BETAMETHASONE SODIUM PHOSPHATE AND BETAMETHASONE ACETATE 6 MG: 3; 3 INJECTION, SUSPENSION INTRA-ARTICULAR; INTRALESIONAL; INTRAMUSCULAR; SOFT TISSUE at 15:50

## 2020-03-09 ASSESSMENT — PAIN SCALES - GENERAL: PAINLEVEL: MODERATE PAIN (5)

## 2020-03-09 ASSESSMENT — MIFFLIN-ST. JEOR: SCORE: 1841.75

## 2020-03-09 NOTE — NURSING NOTE
"Chief Complaint   Patient presents with     Consult     Right foot pain, XR 02/24/2020, \"possible injury from kick boxing 3 years ago\"       Initial /75   Pulse 75   Ht 1.797 m (5' 10.75\")   Wt 89.4 kg (197 lb)   BMI 27.67 kg/m   Estimated body mass index is 27.67 kg/m  as calculated from the following:    Height as of this encounter: 1.797 m (5' 10.75\").    Weight as of this encounter: 89.4 kg (197 lb).  Medications and allergies reviewed.      Asiya GOLDSMITH MA    "

## 2020-03-09 NOTE — PATIENT INSTRUCTIONS
[facility] Injection Discharge Instructions    Procedure: Right 1st MTP steroid injection      You may shower, however avoid swimming, tub baths or hot tubs for 24 hours following your procedure    You may have a mild to moderate increase in pain for several days following the injection.    It may take up to 14 days for the steroid medication to start working although you may feel the effect as early as a few days after the procedure.    You may use ice packs for 10-15 minutes, 3 to 4 times a day at the injection site for comfort    You may use anti-inflammatory medications (such as Ibuprofen or Aleve or Advil) or Tylenol for pain control if necessary    If you were fasting, you may resume your normal diet and medications after the procedure    If you have diabetes, check your blood sugar more frequently than usual as your blood sugar may be higher than normal for 10-14 days following a steroid injection. Contact your doctor who manages your diabetes if your blood sugar is higher than usual      If you experience any of the following, call [facility] @ [phone] or [phone]  -Fever over 100 degree F  -Swelling, bleeding, redness, drainage, warmth at the injection site  - New or worsening pain

## 2020-03-09 NOTE — PROGRESS NOTES
Small Joint Injection/Arthrocentesis: R great MTP    Date/Time: 3/9/2020 3:50 PM  Performed by: Jonathan Brandon MD  Authorized by: Jonathan Brandon MD   Indications:  Pain  Needle Size:  25 G  Guidance: ultrasound     Approach:  Dorsal  Location:  Great toe    Site:  R great MTP                    Medications:  6 mg betamethasone acet & sod phos 6 (3-3) MG/ML; 2 mL ropivacaine 5 MG/ML        Outcome:  Tolerated well, no immediate complications  Procedure discussed: discussed risks, benefits, and alternatives    Consent Given by:  Patient  Timeout: timeout called immediately prior to procedure    Prep: patient was prepped and draped in usual sterile fashion       Patient reported significant improvement of pain after right 1st MTP steroid injection.  Aftercare instructions given to patient.  Plan to follow-up with referring provider.    Jonathan Brandon MD [facility]

## 2020-03-09 NOTE — PROGRESS NOTES
PATIENT HISTORY:  Vadim Head is a 34 year old male who presents to clinic for a painful right foot .  The patient describes the pain as sharp stabbing.  The patient relates the pain level is moderate.  The patient relates pain is located on the ball of the right foot.  The patient relates the pain has been present for the past several weeks.  The patient relates pain with ambulation.  The patient has tried different shoes with little relief.  The patient was sent by Angelica Mcclellan CNP for consultation on the right foot.       REVIEW OF SYSTEMS:  Constitutional, HEENT, cardiovascular, pulmonary, GI, , musculoskeletal, neuro, skin, endocrine and psych systems are negative, except as otherwise noted.     PAST MEDICAL HISTORY: No past medical history on file.     PAST SURGICAL HISTORY: No past surgical history on file.     MEDICATIONS:   Current Outpatient Medications:      Amino Acids (L-CARNITINE PO), , Disp: , Rfl:      Dietary Management Product (NEOKE BCAA4) POWD, , Disp: , Rfl:      Glucos-Chondroit-Hyaluron-MSM (GLUCOSAMINE CHONDROITIN JOINT PO), , Disp: , Rfl:      ibuprofen (ADVIL,MOTRIN) 600 MG tablet, Take 1 tablet (600 mg) by mouth every 6 hours, Disp: 40 tablet, Rfl: 0     NO ACTIVE MEDICATIONS, , Disp: , Rfl:      Omega-3 Fatty Acids (OMEGA 3 PO), , Disp: , Rfl:      rizatriptan (MAXALT) 10 MG tablet, Take 1 tablet (10 mg) by mouth at onset of headache for migraine May repeat in 2 hours. Max 3 tablets/24 hours., Disp: 30 tablet, Rfl: 0     EPINEPHrine (EPIPEN) 0.3 MG/0.3ML injection, Inject 0.3 mLs (0.3 mg) into the muscle once as needed for anaphylaxis (Patient not taking: Reported on 12/13/2017), Disp: 1 each, Rfl: 0     propranolol (INDERAL) 20 MG tablet, Take 1 tablet (20 mg) by mouth 3 times daily as needed (anxiety) (Patient not taking: Reported on 2/24/2020), Disp: 60 tablet, Rfl: 3     sildenafil (VIAGRA) 25 MG tablet, Take 1 tablet (25 mg) by mouth daily as needed 30 min to 4 hrs before  sex. Do not use with nitroglycerin, terazosin or doxazosin. (Patient not taking: Reported on 2/24/2020), Disp: 10 tablet, Rfl: 11     ALLERGIES:  No Known Allergies     SOCIAL HISTORY:   Social History     Socioeconomic History     Marital status:      Spouse name: Not on file     Number of children: Not on file     Years of education: Not on file     Highest education level: Not on file   Occupational History     Not on file   Social Needs     Financial resource strain: Not on file     Food insecurity     Worry: Not on file     Inability: Not on file     Transportation needs     Medical: Not on file     Non-medical: Not on file   Tobacco Use     Smoking status: Never Smoker     Smokeless tobacco: Never Used   Substance and Sexual Activity     Alcohol use: Yes     Comment: one drink per night     Drug use: No     Sexual activity: Yes     Partners: Female     Birth control/protection: Inserts/Ring   Lifestyle     Physical activity     Days per week: Not on file     Minutes per session: Not on file     Stress: Not on file   Relationships     Social connections     Talks on phone: Not on file     Gets together: Not on file     Attends Gnosticist service: Not on file     Active member of club or organization: Not on file     Attends meetings of clubs or organizations: Not on file     Relationship status: Not on file     Intimate partner violence     Fear of current or ex partner: Not on file     Emotionally abused: Not on file     Physically abused: Not on file     Forced sexual activity: Not on file   Other Topics Concern     Parent/sibling w/ CABG, MI or angioplasty before 65F 55M? Yes     Comment: father CABG <61yo   Social History Narrative     Not on file        FAMILY HISTORY:   Family History   Problem Relation Age of Onset     C.A.D. Father         CABG     Lipids Father      Respiratory Maternal Grandfather         lung problem.     Diabetes Paternal Grandfather      Hypertension Paternal Grandfather   "       EXAM:Vitals: /75   Pulse 75   Ht 1.797 m (5' 10.75\")   Wt 89.4 kg (197 lb)   BMI 27.67 kg/m    BMI= Body mass index is 27.67 kg/m .    Weight management plan: Patient was referred to their PCP to discuss a diet and exercise plan.    General appearance: Patient is alert and fully cooperative with history & exam.  No sign of distress is noted during the visit.     Psychiatric: Affect is pleasant & appropriate.  Patient appears motivated to improve health.     Respiratory: Breathing is regular & unlabored while sitting.     HEENT: Hearing is intact to spoken word.  Speech is clear.  No gross evidence of visual impairment that would impact ambulation.     Dermatologic: Skin is intact to right lower extremities without significant lesions, rash or abrasion.  No paronychia or evidence of soft tissue infection is noted.     Vascular: DP & PT pulses are intact & regular on the right.  No significant edema or varicosities noted.  CFT and skin temperature is normal to the right lower extremities.     Neurologic: Lower extremity sensation is intact to light touch.  No evidence of weakness or contracture in the right lower extremities.  No evidence of neuropathy.     Musculoskeletal: Patient is ambulatory without assistive device or brace.  No gross ankle deformity noted.  No foot or ankle joint effusion is noted.  One notes pain on palpation under the first metatarsal phalangeal joint on the right foot.    No surrounding erythema noted.    Radiographs from February 24, 2020 were evaluated including AP, lateral and medial oblique views of the right foot reveals  no cortical erosions or periosteal elevation.  All joint margins appear stable.  There is no apparent fracture or tumor formation noted.  There is no evidence of foreign body.    ASSESSMENT / PLAN:     ICD-10-CM    1. Capsulitis of metatarsophalangeal (MTP) joint of right foot  M77.51        I have explained to Vadim  about the conditions.  We " discussed the nature of the condition as well as the treatment plan and expected length of recovery.  At this time, the patient was instructed on icing, stretching, tissue massage and support.  The patient was fitted with a Dynaflex insert that will aid in offloading the tension forces to the soft tissues and prevent further inflammation.  To reduce the amount of current inflammation, the patient was referred to [facility] for ultrasound-guided steroid injection into the first metatarsophalangeal joint on the right foot.  The patient will return in four weeks for reevaluation if the symptoms do not resolve.      Vadim verbalized agreement with and understanding of the rational for the diagnosis and treatment plan.  All questions were answered to best of my ability and the patient's satisfaction. The patient was advised to contact the clinic with any questions that may arise after the clinic visit.      Disclaimer: This note consists of symbols derived from keyboarding, dictation and/or voice recognition software. As a result, there may be errors in the script that have gone undetected. Please consider this when interpreting information found in this chart.       MEME Raza D.P.M., [email or web address].

## 2020-03-09 NOTE — LETTER
3/9/2020         RE: Vadim Head  [address] [facility] [address]        Dear Colleague,    Thank you for referring your patient, Vadim Head, to the [facility]. Please see a copy of my visit note below.    PATIENT HISTORY:  Vadim Head is a 34 year old male who presents to clinic for a painful right foot .  The patient describes the pain as sharp stabbing.  The patient relates the pain level is moderate.  The patient relates pain is located on the ball of the right foot.  The patient relates the pain has been present for the past several weeks.  The patient relates pain with ambulation.  The patient has tried different shoes with little relief.  The patient was sent by Angelica Mcclellan CNP for consultation on the right foot.       REVIEW OF SYSTEMS:  Constitutional, HEENT, cardiovascular, pulmonary, GI, , musculoskeletal, neuro, skin, endocrine and psych systems are negative, except as otherwise noted.     PAST MEDICAL HISTORY: No past medical history on file.     PAST SURGICAL HISTORY: No past surgical history on file.     MEDICATIONS:   Current Outpatient Medications:      Amino Acids (L-CARNITINE PO), , Disp: , Rfl:      Dietary Management Product (NEOKE BCAA4) POWRUPAL, , Disp: , Rfl:      Glucos-Chondroit-Hyaluron-MSM (GLUCOSAMINE CHONDROITIN JOINT PO), , Disp: , Rfl:      ibuprofen (ADVIL,MOTRIN) 600 MG tablet, Take 1 tablet (600 mg) by mouth every 6 hours, Disp: 40 tablet, Rfl: 0     NO ACTIVE MEDICATIONS, , Disp: , Rfl:      Omega-3 Fatty Acids (OMEGA 3 PO), , Disp: , Rfl:      rizatriptan (MAXALT) 10 MG tablet, Take 1 tablet (10 mg) by mouth at onset of headache for migraine May repeat in 2 hours. Max 3 tablets/24 hours., Disp: 30 tablet, Rfl: 0     EPINEPHrine (EPIPEN) 0.3 MG/0.3ML injection, Inject 0.3 mLs (0.3 mg) into the muscle once as needed for anaphylaxis (Patient not taking: Reported on 12/13/2017), Disp: 1 each, Rfl: 0     propranolol  (INDERAL) 20 MG tablet, Take 1 tablet (20 mg) by mouth 3 times daily as needed (anxiety) (Patient not taking: Reported on 2/24/2020), Disp: 60 tablet, Rfl: 3     sildenafil (VIAGRA) 25 MG tablet, Take 1 tablet (25 mg) by mouth daily as needed 30 min to 4 hrs before sex. Do not use with nitroglycerin, terazosin or doxazosin. (Patient not taking: Reported on 2/24/2020), Disp: 10 tablet, Rfl: 11     ALLERGIES:  No Known Allergies     SOCIAL HISTORY:   Social History     Socioeconomic History     Marital status:      Spouse name: Not on file     Number of children: Not on file     Years of education: Not on file     Highest education level: Not on file   Occupational History     Not on file   Social Needs     Financial resource strain: Not on file     Food insecurity     Worry: Not on file     Inability: Not on file     Transportation needs     Medical: Not on file     Non-medical: Not on file   Tobacco Use     Smoking status: Never Smoker     Smokeless tobacco: Never Used   Substance and Sexual Activity     Alcohol use: Yes     Comment: one drink per night     Drug use: No     Sexual activity: Yes     Partners: Female     Birth control/protection: Inserts/Ring   Lifestyle     Physical activity     Days per week: Not on file     Minutes per session: Not on file     Stress: Not on file   Relationships     Social connections     Talks on phone: Not on file     Gets together: Not on file     Attends Orthodoxy service: Not on file     Active member of club or organization: Not on file     Attends meetings of clubs or organizations: Not on file     Relationship status: Not on file     Intimate partner violence     Fear of current or ex partner: Not on file     Emotionally abused: Not on file     Physically abused: Not on file     Forced sexual activity: Not on file   Other Topics Concern     Parent/sibling w/ CABG, MI or angioplasty before 65F 55M? Yes     Comment: father CABG <61yo   Social History Narrative     Not  "on file        FAMILY HISTORY:   Family History   Problem Relation Age of Onset     C.A.D. Father         CABG     Lipids Father      Respiratory Maternal Grandfather         lung problem.     Diabetes Paternal Grandfather      Hypertension Paternal Grandfather         EXAM:Vitals: /75   Pulse 75   Ht 1.797 m (5' 10.75\")   Wt 89.4 kg (197 lb)   BMI 27.67 kg/m    BMI= Body mass index is 27.67 kg/m .    Weight management plan: Patient was referred to their PCP to discuss a diet and exercise plan.    General appearance: Patient is alert and fully cooperative with history & exam.  No sign of distress is noted during the visit.     Psychiatric: Affect is pleasant & appropriate.  Patient appears motivated to improve health.     Respiratory: Breathing is regular & unlabored while sitting.     HEENT: Hearing is intact to spoken word.  Speech is clear.  No gross evidence of visual impairment that would impact ambulation.     Dermatologic: Skin is intact to right lower extremities without significant lesions, rash or abrasion.  No paronychia or evidence of soft tissue infection is noted.     Vascular: DP & PT pulses are intact & regular on the right.  No significant edema or varicosities noted.  CFT and skin temperature is normal to the right lower extremities.     Neurologic: Lower extremity sensation is intact to light touch.  No evidence of weakness or contracture in the right lower extremities.  No evidence of neuropathy.     Musculoskeletal: Patient is ambulatory without assistive device or brace.  No gross ankle deformity noted.  No foot or ankle joint effusion is noted.  One notes pain on palpation under the first metatarsal phalangeal joint on the right foot.    No surrounding erythema noted.    Radiographs from February 24, 2020 were evaluated including AP, lateral and medial oblique views of the right foot reveals  no cortical erosions or periosteal elevation.  All joint margins appear stable.  There is no " apparent fracture or tumor formation noted.  There is no evidence of foreign body.    ASSESSMENT / PLAN:     ICD-10-CM    1. Capsulitis of metatarsophalangeal (MTP) joint of right foot  M77.51        I have explained to Vadim  about the conditions.  We discussed the nature of the condition as well as the treatment plan and expected length of recovery.  At this time, the patient was instructed on icing, stretching, tissue massage and support.  The patient was fitted with a Dynaflex insert that will aid in offloading the tension forces to the soft tissues and prevent further inflammation.  To reduce the amount of current inflammation, the patient was referred to [facility] [facility] for ultrasound-guided steroid injection into the first metatarsophalangeal joint on the right foot.  The patient will return in four weeks for reevaluation if the symptoms do not resolve.      Vadim verbalized agreement with and understanding of the rational for the diagnosis and treatment plan.  All questions were answered to best of my ability and the patient's satisfaction. The patient was advised to contact the clinic with any questions that may arise after the clinic visit.      Disclaimer: This note consists of symbols derived from keyboarding, dictation and/or voice recognition software. As a result, there may be errors in the script that have gone undetected. Please consider this when interpreting information found in this chart.       RUPAL Tobin.P.MAGO., F.A.C.F.A.S.        Again, thank you for allowing me to participate in the care of your patient.        Sincerely,        Efraín Raza DPM

## 2020-03-12 RX ORDER — BETAMETHASONE SODIUM PHOSPHATE AND BETAMETHASONE ACETATE 3; 3 MG/ML; MG/ML
6 INJECTION, SUSPENSION INTRA-ARTICULAR; INTRALESIONAL; INTRAMUSCULAR; SOFT TISSUE
Status: SHIPPED | OUTPATIENT
Start: 2020-03-09

## 2020-03-12 RX ORDER — ROPIVACAINE HYDROCHLORIDE 5 MG/ML
2 INJECTION, SOLUTION EPIDURAL; INFILTRATION; PERINEURAL
Status: SHIPPED | OUTPATIENT
Start: 2020-03-09

## 2021-01-21 ENCOUNTER — TELEPHONE (OUTPATIENT)
Dept: FAMILY MEDICINE | Facility: CLINIC | Age: 36
End: 2021-01-21

## 2021-01-21 ASSESSMENT — PATIENT HEALTH QUESTIONNAIRE - PHQ9
SUM OF ALL RESPONSES TO PHQ QUESTIONS 1-9: 1
5. POOR APPETITE OR OVEREATING: NOT AT ALL

## 2021-01-21 ASSESSMENT — ANXIETY QUESTIONNAIRES
5. BEING SO RESTLESS THAT IT IS HARD TO SIT STILL: NOT AT ALL
7. FEELING AFRAID AS IF SOMETHING AWFUL MIGHT HAPPEN: NOT AT ALL
2. NOT BEING ABLE TO STOP OR CONTROL WORRYING: NOT AT ALL
6. BECOMING EASILY ANNOYED OR IRRITABLE: NOT AT ALL
1. FEELING NERVOUS, ANXIOUS, OR ON EDGE: NOT AT ALL
GAD7 TOTAL SCORE: 0
3. WORRYING TOO MUCH ABOUT DIFFERENT THINGS: NOT AT ALL

## 2021-01-21 NOTE — TELEPHONE ENCOUNTER
Patient Quality Outreach Summary      Summary:    Patient is due/failing the following:   PHQ-9 Needed    Type of outreach:    Phone, spoke to the patient, questionnaires updated.  States much better, no questions or concerns.  Took the medication for about 2 months then discontinued due to side effects.    PHQ 1/23/2020 1/23/2020 1/21/2021   PHQ-9 Total Score - 10 1   Q9: Thoughts of better off dead/self-harm past 2 weeks Not at all Not at all Not at all     JORDON-7 SCORE 1/23/2020 1/23/2020 1/21/2021   Total Score 9 9 0       PHQ-9 ([employer]) 1/21/2021   1.  Little interest or pleasure in doing things 0   2.  Feeling down, depressed, or hopeless 0   3.  Trouble falling or staying asleep, or sleeping too much 0   4.  Feeling tired or having little energy 0   5.  Poor appetite or overeating 0   6.  Feeling bad about yourself 0   7.  Trouble concentrating 1   8.  Moving slowly or restless 0   9.  Suicidal or self-harm thoughts 0   PHQ-9 Total Score 1   Difficulty at work, home, or with people    JORDON-7   [employer], 2002; Used with Permission) 1/21/2021   1. Feeling nervous, anxious, or on edge 0   2. Not being able to stop or control worrying 0   3. Worrying too much about different things 0   4. Trouble relaxing 0   5. Being so restless that it is hard to sit still 0   6. Becoming easily annoyed or irritable 0   7. Feeling afraid, as if something awful might happen 0   JORDON-7 Total Score 0   If you checked any problems, how difficult have they made it for you to do your work, take care of things at home, or get along with other people?        Questions for provider review:    None                                                                                                                    BAM Hunt MA

## 2021-01-22 ASSESSMENT — ANXIETY QUESTIONNAIRES: GAD7 TOTAL SCORE: 0

## 2021-05-04 ENCOUNTER — VIRTUAL VISIT (OUTPATIENT)
Dept: FAMILY MEDICINE | Facility: CLINIC | Age: 36
End: 2021-05-04
Payer: COMMERCIAL

## 2021-05-04 DIAGNOSIS — J00 ACUTE RHINITIS: ICD-10-CM

## 2021-05-04 DIAGNOSIS — R51.9 LEFT TEMPORAL HEADACHE: Primary | ICD-10-CM

## 2021-05-04 PROCEDURE — 99213 OFFICE O/P EST LOW 20 MIN: CPT | Mod: TEL | Performed by: FAMILY MEDICINE

## 2021-05-04 RX ORDER — FLUTICASONE PROPIONATE 50 MCG
1 SPRAY, SUSPENSION (ML) NASAL DAILY
Qty: 16 G | Refills: 1 | Status: SHIPPED | OUTPATIENT
Start: 2021-05-04

## 2021-05-04 NOTE — PROGRESS NOTES
Vadim is a 35 year old who is being evaluated via a billable telephone visit.      What phone number would you like to be contacted at? [phone]  How would you like to obtain your AVS? MyChart    Assessment & Plan     Left temporal headache  Advised patient his reported symptom history is more suggestive of irritant rhinitis possibly causing mild non-infectious siunsitis or headaches.  Advised no clear indication for abx for now. Treatment with nasal fluticasone discussed. Patient concurred.  Saline nasal irrigation advised.  Return precautions discussed and given to patient.   - fluticasone (FLONASE) 50 MCG/ACT nasal spray  Dispense: 16 g; Refill: 1    Acute rhinitis  See above. Likely irritant, may still be mild virarl illness. Doubt Covi-19.  Treatment as above.  Return precautions discussed and given to patient.   - fluticasone (FLONASE) 50 MCG/ACT nasal spray  Dispense: 16 g; Refill: 1    }     Patient Instructions   Based on your symptom history, there is very low suspicion for bacterial sinusitis.  More suggestive of irritant rhinitis or sinusitis.    Start Flonase nasal spray 1 spray to each nostril daily for at least 2 weeks.    Try saline nasal irrigation systems or sprays.    If worsening after next 5-7 days, contact the care team again.  Patient Education     Allergic Rhinitis  Allergic rhinitis is an allergic reaction that affects the nose, and often the eyes. It s often known as nasal allergies. Nasal allergies are often due to things in the environment that are breathed in. Depending what you are sensitive to, nasal allergies may occur only during certain seasons, or they may occur year round. Common indoor allergens include house dust mites, mold, cockroaches, and pet dander. Outdoor allergens include pollen from trees, grasses, and weeds.    Symptoms include a drippy, stuffy, and itchy nose. They also include sneezing and red and itchy eyes. You may feel tired more often. Severe allergies may  also affect your breathing and trigger a condition called asthma.    Tests can be done to see what allergens are affecting you. You may be referred to an allergy specialist for testing and further evaluation.   Home care  Your healthcare provider may prescribe medicines to help relieve allergy symptoms. These may include oral medicines, nasal sprays, or eye drops.   Ask your provider for advice on how to stay away from substances that you are allergic to. Below are a few tips for each type of allergen.   Pet dander:    Do not have pets with fur and feathers.    If you have a pet, keep it out of your bedroom and off upholstered furniture.  Pollen:    When pollen counts are high, keep windows of your car and home closed. If possible, use an air conditioner instead.    Wear a filter mask when mowing or doing yard work.  House dust mites:    Wash bedding every week in warm water and detergent and dry on a hot setting.    Cover the mattress, box spring, and pillows with allergy covers.     If possible, sleep in a room with no carpet, curtains, or upholstered furniture.  Cockroaches:    Store food in sealed containers.    Remove garbage from the home promptly.    Fix water leaks.  Mold:    Keep humidity low by using a dehumidifier or air conditioner. Keep the dehumidifier and air conditioner clean and free of mold.    Clean moldy areas with bleach and water. Don't mix bleach with other .  In general:    Vacuum once or twice a week. If possible, use a vacuum with a high-efficiency particulate air (HEPA) filter.    Don't smoke. Stay away from cigarette smoke. Cigarette smoke is an irritant that can make symptoms worse.  Follow-up care  Follow up as advised by the healthcare provider or our staff. If you were referred to an allergy specialist, make this appointment promptly.   When to seek medical advice  Call your healthcare provider or get medical care right away if the following occur:     Coughing    Fever of  100.4 F (38 C) or higher, or as directed by your healthcare provider    Raised red bumps (hives)    Continuing symptoms, new symptoms, or worsening symptoms  Call 911  Call 911 if you have:     Trouble breathing    Severe swelling of the face or severe itching of the eyes or mouth    Wheezing or shortness of breath    Chest tightness    Dizziness or lightheadedness    Feeling of doom    Stomach pain, bloating, vomiting, or diarrhea  [employer] last reviewed this educational content on 10/1/2019    9146-7575 [employer]. All rights reserved. This information is not intended as a substitute for professional medical care. Always follow your healthcare professional's instructions.               Return in about 5 days (around 5/9/2021) for In-clinic visit for if wrosening symptoms.    Charly Underwood MD  [facility]    Bela Fierro is a 35 year old who presents for the following health issues:  Chief Complaint   Patient presents with     Sinus Problem     Pt being seen for possible sinus infection.       HPI     Acute Illness  Acute illness concerns: sinus symptoms  Onset/Duration: 1 wk ago  Symptoms:  Fever: no  Chills/Sweats: no  Headache (location?): YES - above left eye radiating to temporal area - pressure like area   Sinus Pressure: YES  Conjunctivitis:  no  Ear Pain: no  Rhinorrhea: YES, worse in the mornings  Congestion: no  Sore Throat: no  Cough: no  Wheeze: no  Decreased Appetite: no  Nausea: no  Vomiting: no  Diarrhea: no  Dysuria/Freq.: no  Dysuria or Hematuria: no  Fatigue/Achiness: no  Sick/Strep Exposure: no  Therapies tried and outcome: None    Patient said had similar episode long time ago - believes he was given abx.    Positive Covid 19 - 1 month ago; tested again few days later - negative.    Patient Active Problem List   Diagnosis     CARDIOVASCULAR SCREENING; LDL GOAL LESS THAN 160     JORDON (generalized anxiety disorder)     Current mild episode of major  depressive disorder without prior episode (H)     History reviewed. No pertinent surgical history.    Social History     Tobacco Use     Smoking status: Never Smoker     Smokeless tobacco: Never Used   Substance Use Topics     Alcohol use: Yes     Comment: one drink per night     Family History   Problem Relation Age of Onset     C.A.D. Father         CABG     Lipids Father      Respiratory Maternal Grandfather         lung problem.     Diabetes Paternal Grandfather      Hypertension Paternal Grandfather          Current Outpatient Medications   Medication Sig Dispense Refill     fluticasone (FLONASE) 50 MCG/ACT nasal spray Spray 1 spray into both nostrils daily 16 g 1     Amino Acids (L-CARNITINE PO)        Dietary Management Product (NEOKE BCAA4) POWD        EPINEPHrine (EPIPEN) 0.3 MG/0.3ML injection Inject 0.3 mLs (0.3 mg) into the muscle once as needed for anaphylaxis (Patient not taking: Reported on 12/13/2017) 1 each 0     Glucos-Chondroit-Hyaluron-MSM (GLUCOSAMINE CHONDROITIN JOINT PO)        ibuprofen (ADVIL,MOTRIN) 600 MG tablet Take 1 tablet (600 mg) by mouth every 6 hours (Patient not taking: Reported on 5/4/2021) 40 tablet 0     NO ACTIVE MEDICATIONS        Omega-3 Fatty Acids (OMEGA 3 PO)        order for DME Equipment being ordered: Dynaflex insert (Patient not taking: Reported on 5/4/2021) 1 Units 0     sildenafil (VIAGRA) 25 MG tablet Take 1 tablet (25 mg) by mouth daily as needed 30 min to 4 hrs before sex. Do not use with nitroglycerin, terazosin or doxazosin. (Patient not taking: Reported on 2/24/2020) 10 tablet 11     No Known Allergies  Review of Systems   C: NEGATIVE for fever, chills, change in weight  I: NEGATIVE for worrisome rashes, moles or lesions  E: NEGATIVE for vision changes or irritation  ENT/MOUTH: see above  RESP:as above  CV: NEGATIVE for chest pain, palpitations or peripheral edema  GI: NEGATIVE for nausea, abdominal pain, heartburn, or change in bowel habits  M: NEGATIVE for  significant arthralgias or myalgia      Objective           Vitals:  No vitals were obtained today due to virtual visit.    Physical Exam   alert and no distress  Voice did not sound nasally congested. No hoarseness.  PSYCH: Alert and oriented times 3; coherent speech, normal   rate and volume, able to articulate logical thoughts, able   to abstract reason, no tangential thoughts, no hallucinations   or delusions  His affect is normal  RESP: No cough, no audible wheezing, able to talk in full sentences  Remainder of exam unable to be completed due to telephone visits    No results found for any visits on 05/04/21.        Phone call duration: 13 minutes

## 2021-05-04 NOTE — PATIENT INSTRUCTIONS
Based on your symptom history, there is very low suspicion for bacterial sinusitis.  More suggestive of irritant rhinitis or sinusitis.    Start Flonase nasal spray 1 spray to each nostril daily for at least 2 weeks.    Try saline nasal irrigation systems or sprays.    If worsening after next 5-7 days, contact the care team again.  Patient Education     Allergic Rhinitis  Allergic rhinitis is an allergic reaction that affects the nose, and often the eyes. It s often known as nasal allergies. Nasal allergies are often due to things in the environment that are breathed in. Depending what you are sensitive to, nasal allergies may occur only during certain seasons, or they may occur year round. Common indoor allergens include house dust mites, mold, cockroaches, and pet dander. Outdoor allergens include pollen from trees, grasses, and weeds.    Symptoms include a drippy, stuffy, and itchy nose. They also include sneezing and red and itchy eyes. You may feel tired more often. Severe allergies may also affect your breathing and trigger a condition called asthma.    Tests can be done to see what allergens are affecting you. You may be referred to an allergy specialist for testing and further evaluation.   Home care  Your healthcare provider may prescribe medicines to help relieve allergy symptoms. These may include oral medicines, nasal sprays, or eye drops.   Ask your provider for advice on how to stay away from substances that you are allergic to. Below are a few tips for each type of allergen.   Pet dander:    Do not have pets with fur and feathers.    If you have a pet, keep it out of your bedroom and off upholstered furniture.  Pollen:    When pollen counts are high, keep windows of your car and home closed. If possible, use an air conditioner instead.    Wear a filter mask when mowing or doing yard work.  House dust mites:    Wash bedding every week in warm water and detergent and dry on a hot setting.    Cover the  mattress, box spring, and pillows with allergy covers.     If possible, sleep in a room with no carpet, curtains, or upholstered furniture.  Cockroaches:    Store food in sealed containers.    Remove garbage from the home promptly.    Fix water leaks.  Mold:    Keep humidity low by using a dehumidifier or air conditioner. Keep the dehumidifier and air conditioner clean and free of mold.    Clean moldy areas with bleach and water. Don't mix bleach with other .  In general:    Vacuum once or twice a week. If possible, use a vacuum with a high-efficiency particulate air (HEPA) filter.    Don't smoke. Stay away from cigarette smoke. Cigarette smoke is an irritant that can make symptoms worse.  Follow-up care  Follow up as advised by the healthcare provider or our staff. If you were referred to an allergy specialist, make this appointment promptly.   When to seek medical advice  Call your healthcare provider or get medical care right away if the following occur:     Coughing    Fever of 100.4 F (38 C) or higher, or as directed by your healthcare provider    Raised red bumps (hives)    Continuing symptoms, new symptoms, or worsening symptoms  Call 911  Call 911 if you have:     Trouble breathing    Severe swelling of the face or severe itching of the eyes or mouth    Wheezing or shortness of breath    Chest tightness    Dizziness or lightheadedness    Feeling of doom    Stomach pain, bloating, vomiting, or diarrhea  [employer]ani last reviewed this educational content on 10/1/2019    1210-9776 [employer]. All rights reserved. This information is not intended as a substitute for professional medical care. Always follow your healthcare professional's instructions.

## 2021-06-10 NOTE — LETTER
3/9/2020         RE: Vadim Head  [address] [facility] [address]        Dear Colleague,    Thank you for referring your patient, Vadim Head, to the [facility]. Please see a copy of my visit note below.    Small Joint Injection/Arthrocentesis: R great MTP    Date/Time: 3/9/2020 3:50 PM  Performed by: Jonathan Brandon MD  Authorized by: Jonathan Brandon MD   Indications:  Pain  Needle Size:  25 G  Guidance: ultrasound     Approach:  Dorsal  Location:  Great toe    Site:  R great MTP                    Medications:  6 mg betamethasone acet & sod phos 6 (3-3) MG/ML; 2 mL ropivacaine 5 MG/ML        Outcome:  Tolerated well, no immediate complications  Procedure discussed: discussed risks, benefits, and alternatives    Consent Given by:  Patient  Timeout: timeout called immediately prior to procedure    Prep: patient was prepped and draped in usual sterile fashion       Patient reported significant improvement of pain after right 1st MTP steroid injection.  Aftercare instructions given to patient.  Plan to follow-up with referring provider.    Jonathan Brandon MD [facility]              Again, thank you for allowing me to participate in the care of your patient.        Sincerely,        Jonathan Brandon MD    
show

## 2021-11-23 ENCOUNTER — E-VISIT (OUTPATIENT)
Dept: FAMILY MEDICINE | Facility: CLINIC | Age: 36
End: 2021-11-23
Payer: COMMERCIAL

## 2021-11-23 ENCOUNTER — TELEPHONE (OUTPATIENT)
Dept: FAMILY MEDICINE | Facility: CLINIC | Age: 36
End: 2021-11-23
Payer: COMMERCIAL

## 2021-11-23 DIAGNOSIS — M54.50 ACUTE LOW BACK PAIN, UNSPECIFIED BACK PAIN LATERALITY, UNSPECIFIED WHETHER SCIATICA PRESENT: Primary | ICD-10-CM

## 2021-11-23 PROCEDURE — 99207 PR NON-BILLABLE SERV PER CHARTING: CPT | Performed by: FAMILY MEDICINE

## 2021-11-23 NOTE — PATIENT INSTRUCTIONS
Thank you for choosing us for your care. I think an in-clinic visit would be best next steps based on your symptoms. Please schedule a clinic appointment; you won t be charged for this eVisit.      You can schedule an appointment right here in [facility], or call [phone]    Caring for Your Back    You are not alone.    Low back pain is very common. Nearly half of all adults will have low back pain in any given year. The good news is that back pain is rarely a danger to your health. Most people can manage their back pain on their own. About half of people start feeling better within two weeks. In 9 out of 10 cases, low back pain goes away or no longer limits daily activity within 6 weeks.     Your outlook is good!     Your symptoms tell us that your low back pain is most likely not a danger to you. Most of the time we will not know the exact cause of low back pain, even if you see a doctor or have an MRI. However, treatment can still work without knowing the cause of the pain. Less than 1 in 100 people need surgery for their back pain.     What can I do about my low back pain?     There are three basic things you can do to ease low back pain and help it go away.     Use heat or cold packs.    Take medicine as directed.    Use positions, movements and exercises.    Using heat or cold packs:    Try cold packs or gentle heat to ease your pain.  Use whichever gives you the most relief. Apply the cold pack or heat for 15 minutes at a time, as often as needed.    Taking medicine:    If taking over-the-counter medicine:    Take ibuprofen (Advil, Motrin) 600 mg three times a day as needed for pain.  OR    Take Aleve (naproxen) 220 to 440 mg two times a day as needed for pain. If your doctor prescribed a muscle relaxant (cyclobenzaprine 10 mg.):    Take   to 1 tablet at bedtime.    Do not drive when taking this medicine. This drug may make you sleepy.     Using positions, movements and exercises:    Research tells us that  moving your joints and muscles can help you recover from back pain. Such activity should be simple and gentle. Use the positions below as well as walking to help relieve your pain. Try taking a short walk every 3 to 4 hours during the day. Walk for a few minutes inside your home or take longer walks outside, on a treadmill or at a mall. Slowly increase the amount of time you walk. Expect discomfort when you begin, but it should lessen as your back starts to heal. When your back feels better, walk daily to keep your back and body healthy.    Finding a position that is comfortable:    When your back pain is new, certain positions will ease your pain. Gently try each of the positions below until you find one that is helpful. Once you find a position of comfort, use it as often as you like when you are resting. You will recover faster if you combine rest with activity.    * Lie on your back with your legs bent. You can do this by placing a pillow under your knees or lie on the floor and rest your lower legs on the seat of a chair.  * Lie on your side with your knees bent and place a pillow between your knees.    Lie on your stomach over pillows.       When should I call my doctor?    Your back pain should improve over the first couple of weeks. As it improves, you should be able to return to your normal activities.  But call your doctor if:      You have a sudden change in your ability to control your bladder or bowels.    You begin to feel tingling in your groin or legs.    The pain spreads down your leg and into your foot.    Your toes, feet or leg muscles begin to feel weak.    You feel generally unwell or sick.    Your pain gets worse.    If you are deaf or hard of hearing, please let us know. We provide many free services including sign language interpreters, oral interpreters, TTYs, telephone amplifiers, note takers and written materials.    For informational purposes only. Not to replace the advice of your health  care provider. Copyright   2013 [facility]. All rights reserved. [employer] [id number] - 04/13.

## 2022-01-02 ENCOUNTER — HEALTH MAINTENANCE LETTER (OUTPATIENT)
Age: 37
End: 2022-01-02

## 2022-11-19 ENCOUNTER — HEALTH MAINTENANCE LETTER (OUTPATIENT)
Age: 37
End: 2022-11-19

## 2022-12-19 ENCOUNTER — HOSPITAL ENCOUNTER (EMERGENCY)
Facility: CLINIC | Age: 37
Discharge: HOME | End: 2022-12-19
Attending: PHYSICIAN ASSISTANT | Admitting: PHYSICIAN ASSISTANT
Payer: COMMERCIAL

## 2022-12-19 ENCOUNTER — APPOINTMENT (OUTPATIENT)
Dept: MRI IMAGING | Facility: CLINIC | Age: 37
End: 2022-12-19
Attending: PHYSICIAN ASSISTANT
Payer: COMMERCIAL

## 2022-12-19 VITALS
WEIGHT: 205 LBS | DIASTOLIC BLOOD PRESSURE: 78 MMHG | BODY MASS INDEX: 27.77 KG/M2 | OXYGEN SATURATION: 100 % | HEART RATE: 59 BPM | TEMPERATURE: 97.6 F | HEIGHT: 72 IN | SYSTOLIC BLOOD PRESSURE: 119 MMHG | RESPIRATION RATE: 16 BRPM

## 2022-12-19 DIAGNOSIS — R93.7 ABNORMAL MUSCULOSKELETAL DIAGNOSTIC IMAGING: ICD-10-CM

## 2022-12-19 DIAGNOSIS — M54.41 ACUTE BILATERAL LOW BACK PAIN WITH BILATERAL SCIATICA: ICD-10-CM

## 2022-12-19 DIAGNOSIS — M54.42 ACUTE BILATERAL LOW BACK PAIN WITH BILATERAL SCIATICA: ICD-10-CM

## 2022-12-19 PROCEDURE — 96374 THER/PROPH/DIAG INJ IV PUSH: CPT | Mod: 59 | Performed by: PHYSICIAN ASSISTANT

## 2022-12-19 PROCEDURE — A9585 GADOBUTROL INJECTION: HCPCS | Performed by: PHYSICIAN ASSISTANT

## 2022-12-19 PROCEDURE — 99284 EMERGENCY DEPT VISIT MOD MDM: CPT | Performed by: PHYSICIAN ASSISTANT

## 2022-12-19 PROCEDURE — 99285 EMERGENCY DEPT VISIT HI MDM: CPT | Mod: 25 | Performed by: PHYSICIAN ASSISTANT

## 2022-12-19 PROCEDURE — 250N000013 HC RX MED GY IP 250 OP 250 PS 637: Performed by: PHYSICIAN ASSISTANT

## 2022-12-19 PROCEDURE — 72148 MRI LUMBAR SPINE W/O DYE: CPT

## 2022-12-19 PROCEDURE — 72158 MRI LUMBAR SPINE W/O & W/DYE: CPT

## 2022-12-19 PROCEDURE — 255N000002 HC RX 255 OP 636: Performed by: PHYSICIAN ASSISTANT

## 2022-12-19 RX ORDER — GADOBUTROL 604.72 MG/ML
9 INJECTION INTRAVENOUS ONCE
Status: COMPLETED | OUTPATIENT
Start: 2022-12-19 | End: 2022-12-19

## 2022-12-19 RX ORDER — OXYCODONE HYDROCHLORIDE 5 MG/1
5 TABLET ORAL EVERY 6 HOURS PRN
Qty: 12 TABLET | Refills: 0 | Status: SHIPPED | OUTPATIENT
Start: 2022-12-19 | End: 2022-12-22

## 2022-12-19 RX ORDER — OXYCODONE HYDROCHLORIDE 5 MG/1
5 TABLET ORAL ONCE
Status: COMPLETED | OUTPATIENT
Start: 2022-12-19 | End: 2022-12-19

## 2022-12-19 RX ADMIN — OXYCODONE HYDROCHLORIDE 5 MG: 5 TABLET ORAL at 12:08

## 2022-12-19 RX ADMIN — GADOBUTROL 9 ML: 604.72 INJECTION INTRAVENOUS at 12:53

## 2022-12-19 ASSESSMENT — ENCOUNTER SYMPTOMS
RESPIRATORY NEGATIVE: 1
CONSTITUTIONAL NEGATIVE: 1
NEUROLOGICAL NEGATIVE: 1
GASTROINTESTINAL NEGATIVE: 1
CARDIOVASCULAR NEGATIVE: 1
BACK PAIN: 1

## 2022-12-19 ASSESSMENT — ACTIVITIES OF DAILY LIVING (ADL)
ADLS_ACUITY_SCORE: 35
ADLS_ACUITY_SCORE: 35

## 2022-12-19 NOTE — ED PROVIDER NOTES
History     Chief Complaint   Patient presents with     Back Pain     HPI  Vadim Head is a 37 year old male who presents with complaints of diffuse low back pain and bilateral posterior upper leg pain since yesterday morning.  Patient states he slipped but did not fall on Saturday.  He states he subsequently developed diffuse low back pain that is worse with moving and walking.  He states he has difficulty standing straight.  Patient states he was unable to get out of bed this morning due to the pain.  Patient also complains of tingling to both thighs as well as a feeling of pressure and numbness in the groin.  Patient states he has had intermittent back pain in the past but he seems to be experiencing more frequent episodes.  Patient has followed with Dr. Claire through [facility] in the past.  He followed up with [facility] urgent care today and they started him on a steroid pack and recommended he come to the emergency department to have an MRI of his low back done in order to rule out cauda equina syndrome.  They did x-rays today.  Denies bowel or bladder incontinence.  Gait is steady but guarded.  Denies fevers, chills, nausea, vomiting, abdominal pain, urinary symptoms, or leg pain/swelling.  No hx cancer or long-term steroid use.       Allergies:  No Known Allergies    Problem List:    Patient Active Problem List    Diagnosis Date Noted     JORDON (generalized anxiety disorder) 12/23/2019     Priority: Medium     Current mild episode of major depressive disorder without prior episode (H) 12/23/2019     Priority: Medium     CARDIOVASCULAR SCREENING; LDL GOAL LESS THAN 160 10/31/2010     Priority: Medium        Past Medical History:    No past medical history on file.    Past Surgical History:    No past surgical history on file.    Family History:    Family History   Problem Relation Age of Onset     C.A.D. Father         CABG     Lipids Father      Respiratory Maternal Grandfather         lung problem.     Diabetes  Paternal Grandfather      Hypertension Paternal Grandfather        Social History:  Marital Status:   [2]  Social History     Tobacco Use     Smoking status: Never     Smokeless tobacco: Never   Substance Use Topics     Alcohol use: Yes     Comment: one drink per night     Drug use: No        Medications:    oxyCODONE (ROXICODONE) 5 MG tablet  Amino Acids (L-CARNITINE PO)  Dietary Management Product (NEOKE BCAA4) POWD  EPINEPHrine (EPIPEN) 0.3 MG/0.3ML injection  fluticasone (FLONASE) 50 MCG/ACT nasal spray  Glucos-Chondroit-Hyaluron-MSM (GLUCOSAMINE CHONDROITIN JOINT PO)  ibuprofen (ADVIL,MOTRIN) 600 MG tablet  NO ACTIVE MEDICATIONS  Omega-3 Fatty Acids (OMEGA 3 PO)  order for DME          Review of Systems   Constitutional: Negative.    Respiratory: Negative.    Cardiovascular: Negative.    Gastrointestinal: Negative.    Genitourinary: Negative.    Musculoskeletal: Positive for back pain.   Skin: Negative.    Neurological: Negative.    All other systems reviewed and are negative.      Physical Exam   BP: 122/83  Pulse: 66  Temp: 97.6  F (36.4  C)  Resp: 16  Height: 182.9 cm (6')  Weight: 93 kg (205 lb)  SpO2: 100 %      Physical Exam  Constitutional:       General: He is not in acute distress.     Appearance: Normal appearance. He is well-developed. He is not ill-appearing, toxic-appearing or diaphoretic.   HENT:      Head: Normocephalic and atraumatic.      Right Ear: External ear normal.      Left Ear: External ear normal.      Nose: Nose normal.      Mouth/Throat:      Lips: Pink.      Mouth: Mucous membranes are moist.   Eyes:      Conjunctiva/sclera: Conjunctivae normal.   Cardiovascular:      Rate and Rhythm: Normal rate and regular rhythm.      Pulses: Normal pulses.      Heart sounds: Normal heart sounds.   Pulmonary:      Effort: Pulmonary effort is normal.      Breath sounds: Normal breath sounds.   Abdominal:      General: There is no distension.      Palpations: Abdomen is soft.      Tenderness:  There is no abdominal tenderness. There is no guarding or rebound.   Musculoskeletal:      Cervical back: Normal, normal range of motion and neck supple. No swelling, spasms, tenderness or bony tenderness. No pain with movement. Normal range of motion.      Thoracic back: Normal. No swelling, spasms, tenderness or bony tenderness. Normal range of motion.      Lumbar back: Tenderness and bony tenderness present. No swelling, edema or spasms. Decreased range of motion.      Comments: Diffuse lumbar spine tenderness to palpation   Skin:     General: Skin is warm and dry.      Capillary Refill: Capillary refill takes less than 2 seconds.   Neurological:      Mental Status: He is alert.      Sensory: Sensation is intact.      Comments: Full strength with plantarflexion and dorsiflexion against resistance.  Patient has mild weakness with straight leg raise against resistance bilaterally.  Sensation intact.   Psychiatric:         Mood and Affect: Mood normal.         Behavior: Behavior is cooperative.         ED Course                 Procedures      Results for orders placed or performed during the hospital encounter of 12/19/22 (from the past 24 hour(s))   MR Lumbar Spine w/o & w Contrast    Narrative    MRI LUMBAR SPINE WITHOUT AND WITH CONTRAST   12/19/2022 12:55 PM     HISTORY: Low back pain, paresthesias to bilateral thighs, numbness to  groin, no loss of bowel/bladder.     TECHNIQUE: Multiplanar multisequence MRI of the lumbar spine without  and with 9 mL Gadavist.     COMPARISON: CT abdomen and pelvis dated 4/5/2014.     FINDINGS: Nomenclature is based on 5 lumbar vertebral bodies. Normal  vertebral body heights and sagittal alignment. Smaller area of  benign-appearing focal fatty marrow in the left L5 pedicle. Bone  marrow signal otherwise appears unremarkable. Normal appearance of the  distal spinal cord with the conus terminating at L2.    There is a nonspecific ovoid cystic-appearing lesion with suggestion  of  mild internal complexity/thin internal septations and T2  hypointense rim positioned in the gluteal musculature (likely gluteus  medius) just lateral to the right iliac bone (series 6 image 57). This  lesion demonstrates thin mild peripheral enhancement and measures  approximately 2.9 x 1.7 x 3.0 cm. No significant mass effect. No  erosive changes of the adjacent right iliac bone or other definite  aggressive features. The visualized paraspinal soft tissues otherwise  appear unremarkable.    Segmental analysis:  T12-L1: Normal disc height and signal. No herniation. Normal facets.  No spinal canal or neural foraminal stenosis.    L1-L2: Normal disc height and signal. No herniation. Normal facets. No  spinal canal or neural foraminal stenosis.    L2-L3: Normal disc height and signal. No herniation. Mild facet  arthropathy. No spinal canal or neural foraminal stenosis.    L3-L4: Normal disc height and signal. No herniation. Normal facets. No  significant spinal canal or neural foraminal stenosis.    L4-L5: Disc desiccation with mild disc height loss. Symmetric disc  bulge with posterior central annular fissuring. Mild to moderate facet  arthropathy. Mild bilateral lateral recess encroachment without  displacement of the traversing L5 nerve roots. No significant central  spinal canal stenosis. No significant neural foraminal stenosis.    L5-S1: Disc desiccation with mild to moderate disc height loss.  Shallow symmetric disc bulge with superimposed small central disc  protrusion and a single annular fissure (series 6 image 51) minimally  abutting the ventral margin of the traversing left S1 nerve root  sleeve. No contour deformity or displacement of the traversing S1  nerve roots. No central spinal canal stenosis. Mild bilateral facet  arthropathy. Mild bilateral neural foraminal stenosis.      Impression    IMPRESSION:  1. Ovoid mildly complex cystic-appearing intramuscular lesion with  thin peripheral enhancement in  the right gluteus musculature adjacent  to the lateral margin of the right iliac bone. This is nonspecific.  This may represent an old intramuscular hematoma. Less likely  considerations would include low-grade myxoid tumor or cystic  peripheral nerve sheath tumor. Barring any significant change in the  patient's clinical symptoms, recommend follow-up by pelvis MRI without  and with contrast in 3-6 months.  2. Lower lumbar spine degenerative changes, as described.  3. No significant central spinal canal stenosis. No high-grade lateral  recess stenosis.  4. Mild bilateral L5-S1 neural foraminal stenosis. No other  significant neural foraminal narrowing.       Medications   lactated ringers BOLUS 1,000 mL (has no administration in time range)   oxyCODONE (ROXICODONE) tablet 5 mg (5 mg Oral Given 12/19/22 1208)   gadobutrol (GADAVIST) injection 9 mL (9 mLs Intravenous Given 12/19/22 1253)       Assessments & Plan (with Medical Decision Making)     Pt is a 37 year old male who presents with complaints of diffuse low back pain and bilateral posterior upper leg pain since yesterday morning.  Patient states he slipped but did not fall on Saturday.  He states he subsequently developed diffuse low back pain that is worse with moving and walking.  He states he has difficulty standing straight.  Patient states he was unable to get out of bed this morning due to the pain.  Patient also complains of tingling to both thighs as well as a feeling of pressure and numbness in the groin.  Patient states he has had intermittent back pain in the past but he seems to be experiencing more frequent episodes.  Patient has followed with Dr. Claire through [facility] in the past.  He followed up with [facility] urgent care today and they started him on a steroid pack and recommended he come to the emergency department to have an MRI of his low back done in order to rule out cauda equina syndrome.  They did x-rays today.  Denies bowel or bladder  incontinence.     Pt is afebrile on arrival.  Exam as above.  Diffuse lumbar spine tenderness to palpation on exam.  Patient was given oral dose of oxycodone here with improvement in pain.  Lumbar spine MRI was obtained and shows lower lumbar spine degenerative changes.  No significant central spinal canal stenosis.  Normal appearance of the distal spinal cord.  No high-grade lateral recess stenosis.  Mild bilateral L5-S1 neuroforaminal stenosis with shallow symmetric disc bulge at this level and small central disc protrusion.  Also noted was an ovoid mildly complex cystic appearing intramuscular lesion with thin peripheral enhancement in the right gluteus musculature which is nonspecific.  This may represent an old intramuscular hematoma.  Less likely would include myxoid tumor versus cystic peripheral nerve sheath tumor.  Radiology recommends follow-up pelvic MRI in 3 to 6 months to monitor this area.  Patient does endorse prior injuries in which he has fallen.  Spine specialist referral placed.  Patient has followed with Dr. Claire in the past and recommended close follow-up with him in clinic this week.  He was prescribed steroid pack during his [facility] urgent care visit today.  Instructed to take this as prescribed.  Will also send with pain medications.  Encouraged symptomatic treatments at home.  Return precautions were reviewed.  Hand-outs were provided.    When patient returned back to the ER from MRI and was transferred from the wheelchair to the bed in his room, patient became lightheaded, diaphoretic, pale, and his blood pressure dropped to 85 systolic.  Patient laid down in bed.  Nursing attempted IV placement in order to give fluids however were unable to gain access on first attempt, and patient declined having this tried again.  Patient rested and started to feel better and blood pressure normalized.  Suspect vasovagal episode.  His pain has improved and he is feeling better.     Patient was sent with  Oxycodone for pain and was instructed to follow-up with [facility] spine specialist for continued care and management.  He is to return to the ED for persistent and/or worsening symptoms.  Patient expressed understanding of the diagnosis and plan and was discharged home in good condition.    I have reviewed the nursing notes.    I have reviewed the findings, diagnosis, plan and need for follow up with the patient.    Discharge Medication List as of 12/19/2022  3:17 PM      START taking these medications    Details   oxyCODONE (ROXICODONE) 5 MG tablet Take 1 tablet (5 mg) by mouth every 6 hours as needed for pain, Disp-12 tablet, R-0, Local Print             Final diagnoses:   Acute bilateral low back pain with bilateral sciatica   Abnormal musculoskeletal diagnostic imaging - Ovoid mildly complex cystic-appearing intramuscular lesion with thin peripheral enhancement in the right gluteus musculature adjacent to the lateral margin of the right iliac bone. This is nonspecific. This may represent an old intramuscular hematoma. Less likely considerations would include low-grade myxoid tumor or cystic peripheral nerve sheath tumor. Barring any significant change in the patient's clinical symptoms, recommend follow-up by pelvis MRI without and with contrast in 3-6 months.        12/19/2022   [facility] EMERGENCY DEPT      Disclaimer:  This note consists of symbols derived from keyboarding, dictation and/or voice recognition software.  As a result, there may be errors in the script that have gone undetected.  Please consider this when interpreting information found in this chart.     Mady Diaz PA-C  12/19/22 2161

## 2022-12-19 NOTE — ED TRIAGE NOTES
"  Pt states he has lower back pain that started \"mostly yesterday morning\".   Pt states he slipped a little but did not fall.  Went to [facility] today and they did xrs and sent him to have \"emergency MRI today\".    Pt with tingling in both thighs.   Pt reports difficulty standing \"its like my body doesn't want me to stand\".        "

## 2023-04-09 ENCOUNTER — HEALTH MAINTENANCE LETTER (OUTPATIENT)
Age: 38
End: 2023-04-09

## 2024-06-16 ENCOUNTER — HEALTH MAINTENANCE LETTER (OUTPATIENT)
Age: 39
End: 2024-06-16

## 2025-01-03 ENCOUNTER — MEDICAL CORRESPONDENCE (OUTPATIENT)
Dept: HEALTH INFORMATION MANAGEMENT | Facility: CLINIC | Age: 40
End: 2025-01-03
Payer: COMMERCIAL

## 2025-01-03 ENCOUNTER — TRANSFERRED RECORDS (OUTPATIENT)
Dept: HEALTH INFORMATION MANAGEMENT | Facility: CLINIC | Age: 40
End: 2025-01-03
Payer: COMMERCIAL

## 2025-01-28 PROBLEM — F41.1 GAD (GENERALIZED ANXIETY DISORDER): Status: RESOLVED | Noted: 2019-12-23 | Resolved: 2025-01-28

## 2025-01-28 PROBLEM — M54.50 LOW BACK PAIN, UNSPECIFIED: Status: ACTIVE | Noted: 2025-01-28

## 2025-01-28 PROBLEM — F32.0 CURRENT MILD EPISODE OF MAJOR DEPRESSIVE DISORDER WITHOUT PRIOR EPISODE: Status: RESOLVED | Noted: 2019-12-23 | Resolved: 2025-01-28

## 2025-01-28 PROBLEM — G89.29 OTHER CHRONIC PAIN: Status: ACTIVE | Noted: 2025-01-28

## 2025-01-29 ENCOUNTER — OFFICE VISIT (OUTPATIENT)
Dept: FAMILY MEDICINE | Facility: CLINIC | Age: 40
End: 2025-01-29
Payer: COMMERCIAL

## 2025-01-29 ENCOUNTER — MYC MEDICAL ADVICE (OUTPATIENT)
Dept: FAMILY MEDICINE | Facility: CLINIC | Age: 40
End: 2025-01-29

## 2025-01-29 VITALS
DIASTOLIC BLOOD PRESSURE: 72 MMHG | SYSTOLIC BLOOD PRESSURE: 118 MMHG | RESPIRATION RATE: 16 BRPM | WEIGHT: 223.8 LBS | TEMPERATURE: 97.6 F | HEIGHT: 72 IN | OXYGEN SATURATION: 96 % | BODY MASS INDEX: 30.31 KG/M2 | HEART RATE: 73 BPM

## 2025-01-29 DIAGNOSIS — J33.9 NASAL POLYP: ICD-10-CM

## 2025-01-29 DIAGNOSIS — L08.9 SOFT TISSUE INFECTION: Primary | ICD-10-CM

## 2025-01-29 PROCEDURE — 99203 OFFICE O/P NEW LOW 30 MIN: CPT

## 2025-01-29 RX ORDER — CEPHALEXIN 500 MG/1
500 CAPSULE ORAL 3 TIMES DAILY
Qty: 21 CAPSULE | Refills: 0 | Status: SHIPPED | OUTPATIENT
Start: 2025-01-29 | End: 2025-02-05

## 2025-01-29 ASSESSMENT — ANXIETY QUESTIONNAIRES
4. TROUBLE RELAXING: NOT AT ALL
8. IF YOU CHECKED OFF ANY PROBLEMS, HOW DIFFICULT HAVE THESE MADE IT FOR YOU TO DO YOUR WORK, TAKE CARE OF THINGS AT HOME, OR GET ALONG WITH OTHER PEOPLE?: NOT DIFFICULT AT ALL
2. NOT BEING ABLE TO STOP OR CONTROL WORRYING: NOT AT ALL
3. WORRYING TOO MUCH ABOUT DIFFERENT THINGS: NOT AT ALL
GAD7 TOTAL SCORE: 0
5. BEING SO RESTLESS THAT IT IS HARD TO SIT STILL: NOT AT ALL
IF YOU CHECKED OFF ANY PROBLEMS ON THIS QUESTIONNAIRE, HOW DIFFICULT HAVE THESE PROBLEMS MADE IT FOR YOU TO DO YOUR WORK, TAKE CARE OF THINGS AT HOME, OR GET ALONG WITH OTHER PEOPLE: NOT DIFFICULT AT ALL
7. FEELING AFRAID AS IF SOMETHING AWFUL MIGHT HAPPEN: NOT AT ALL
GAD7 TOTAL SCORE: 0
7. FEELING AFRAID AS IF SOMETHING AWFUL MIGHT HAPPEN: NOT AT ALL
GAD7 TOTAL SCORE: 0
1. FEELING NERVOUS, ANXIOUS, OR ON EDGE: NOT AT ALL
6. BECOMING EASILY ANNOYED OR IRRITABLE: NOT AT ALL

## 2025-01-29 ASSESSMENT — PAIN SCALES - GENERAL: PAINLEVEL_OUTOF10: MODERATE PAIN (4)

## 2025-01-29 ASSESSMENT — PATIENT HEALTH QUESTIONNAIRE - PHQ9
SUM OF ALL RESPONSES TO PHQ QUESTIONS 1-9: 0
10. IF YOU CHECKED OFF ANY PROBLEMS, HOW DIFFICULT HAVE THESE PROBLEMS MADE IT FOR YOU TO DO YOUR WORK, TAKE CARE OF THINGS AT HOME, OR GET ALONG WITH OTHER PEOPLE: NOT DIFFICULT AT ALL
SUM OF ALL RESPONSES TO PHQ QUESTIONS 1-9: 0

## 2025-01-29 NOTE — PATIENT INSTRUCTIONS
Plan:  - continue saline rinses up to 3 times a day  - Flonase daily - over the counter  - bedside humidification at nighttime  - wear a mask when encountering environmental elements as tolerable/able  - keflex three times a day for 7 days

## 2025-01-29 NOTE — PROGRESS NOTES
Assessment & Plan     Soft tissue infection  Treat with a course of keflex. Reviewed risks/benefits/side effects. Plan to reassess improvement in one week via [employer] message.   - cephALEXin (KEFLEX) 500 MG capsule; Take 1 capsule (500 mg) by mouth 3 times daily for 7 days.    Nasal polyp  Present in right nare. Recommend OTC Flonase to reduce inflammation.     BMI  Estimated body mass index is 30.35 kg/m  as calculated from the following:    Height as of this encounter: 1.829 m (6').    Weight as of this encounter: 101.5 kg (223 lb 12.8 oz).       Patient Instructions   Plan:  - continue saline rinses up to 3 times a day  - Flonase daily - over the counter  - bedside humidification at nighttime  - wear a mask when encountering environmental elements as tolerable/able  - keflex three times a day for 7 days    Bela Fierro is a 39 year old, presenting for the following health issues:  Nose Problem (Scratched the inside of right nare x1 week ago; bridge of nose is red and swollen; painful to the touch and aches all the time; no abnormal nasal drainage)    Building a house, exposures to many elements and irritants, very dry on the inside of nose, recalls a hard secretion that he tried to expel, he suspects it scraped the inside of his nose. Onset of symptoms on 1/22/2025, tolerable. Flew to [place], noted symptoms worsened upon return. Using neti-pot/saline rinses, which seems helpful. Tender to the touch. No fevers/chills. Notes headache, but otherwise no generalized symptoms.          1/29/2025    10:49 AM   Additional Questions   Roomed by Aleta     History of Present Illness       Reason for visit:  Scratched the inside of my nose   He is taking medications regularly.    Chief Complaint   Patient presents with    Nose Problem     Scratched the inside of right nare x1 week ago; bridge of nose is red and swollen; painful to the touch and aches all the time; no abnormal nasal drainage       Patient due for PHQ-9  and JORDON-7 Screenings:      1/29/2025    10:38 AM   Last PHQ-9   1.  Little interest or pleasure in doing things 0   2.  Feeling down, depressed, or hopeless 0   3.  Trouble falling or staying asleep, or sleeping too much 0   4.  Feeling tired or having little energy 0   5.  Poor appetite or overeating 0   6.  Feeling bad about yourself 0   7.  Trouble concentrating 0   8.  Moving slowly or restless 0   Q9: Thoughts of better off dead/self-harm past 2 weeks 0   PHQ-9 Total Score 0        Patient-reported         1/29/2025    10:39 AM   JORDON-7    1. Feeling nervous, anxious, or on edge 0   2. Not being able to stop or control worrying 0   3. Worrying too much about different things 0   4. Trouble relaxing 0   5. Being so restless that it is hard to sit still 0   6. Becoming easily annoyed or irritable 0   7. Feeling afraid, as if something awful might happen 0   JORDON-7 Total Score 0    If you checked any problems, how difficult have they made it for you to do your work, take care of things at home, or get along with other people? Not difficult at all       Patient-reported                 Review of Systems  Constitutional, HEENT, cardiovascular, pulmonary, gi and gu systems are negative, except as otherwise noted.      Objective    /72   Pulse 73   Temp 97.6  F (36.4  C) (Tympanic)   Resp 16   Ht 1.829 m (6')   Wt 101.5 kg (223 lb 12.8 oz)   SpO2 96%   BMI 30.35 kg/m    Body mass index is 30.35 kg/m .  Physical Exam   GENERAL: alert and no distress  HENT: normal cephalic/atraumatic, sinuses: not tender, nasal polyp present in right nare  RESP: lungs clear to auscultation - no rales, rhonchi or wheezes  CV: regular rate and rhythm, normal S1 S2, no S3 or S4, no murmur, click or rub, no peripheral edema  SKIN: erythema -  nose, slight erythema tenderness, right side       Signed Electronically by: ALLISON Wood CNP

## 2025-02-12 NOTE — TELEPHONE ENCOUNTER
RN called patient and he reports the infection cleared up and there are no further issues at this time.    Lizbeth Cho RN on 2/12/2025 at 5:55 PM    
details…

## 2025-05-14 ENCOUNTER — HOSPITAL ENCOUNTER (EMERGENCY)
Facility: CLINIC | Age: 40
Discharge: HOME | End: 2025-05-14
Attending: STUDENT IN AN ORGANIZED HEALTH CARE EDUCATION/TRAINING PROGRAM
Payer: COMMERCIAL

## 2025-05-14 VITALS
BODY MASS INDEX: 29.12 KG/M2 | DIASTOLIC BLOOD PRESSURE: 86 MMHG | TEMPERATURE: 98 F | HEART RATE: 74 BPM | RESPIRATION RATE: 18 BRPM | SYSTOLIC BLOOD PRESSURE: 117 MMHG | HEIGHT: 72 IN | OXYGEN SATURATION: 100 % | WEIGHT: 215 LBS

## 2025-05-14 DIAGNOSIS — S01.112A EYEBROW LACERATION, LEFT, INITIAL ENCOUNTER: ICD-10-CM

## 2025-05-14 PROCEDURE — 99282 EMERGENCY DEPT VISIT SF MDM: CPT | Mod: 25 | Performed by: STUDENT IN AN ORGANIZED HEALTH CARE EDUCATION/TRAINING PROGRAM

## 2025-05-14 PROCEDURE — 12011 RPR F/E/E/N/L/M 2.5 CM/<: CPT | Performed by: STUDENT IN AN ORGANIZED HEALTH CARE EDUCATION/TRAINING PROGRAM

## 2025-05-14 ASSESSMENT — COLUMBIA-SUICIDE SEVERITY RATING SCALE - C-SSRS
1. IN THE PAST MONTH, HAVE YOU WISHED YOU WERE DEAD OR WISHED YOU COULD GO TO SLEEP AND NOT WAKE UP?: NO
6. HAVE YOU EVER DONE ANYTHING, STARTED TO DO ANYTHING, OR PREPARED TO DO ANYTHING TO END YOUR LIFE?: NO
2. HAVE YOU ACTUALLY HAD ANY THOUGHTS OF KILLING YOURSELF IN THE PAST MONTH?: NO

## 2025-05-14 NOTE — DISCHARGE INSTRUCTIONS
Keep the wound clean and dry.  You can shower and gently wash it with soap and water.  Do not pick at it.  The glue loses its integrity in about a week.  Follow-up with your regular doctor with ongoing concerns.  Return to the ER if you develop any signs of infection

## 2025-05-14 NOTE — ED PROVIDER NOTES
History     Chief Complaint   Patient presents with    Laceration     HPI  Vadim Head is a 39 year old male who has no significant medical history, who presents to the ER for evaluation of eyebrow laceration.  Patient states that he and his wife ran outside because they thought a coyote was chasing their dog and the patient was behind his wife and the screen door struck him on the left eyebrow.  He denies vision issues.  There was no loss of consciousness.  He has no other complaints.  Tetanus is not up-to-date and patient refuses tetanus.    Allergies:  No Known Allergies    Problem List:    Patient Active Problem List    Diagnosis Date Noted    Low back pain, unspecified 01/28/2025     Priority: Medium    Other chronic pain 01/28/2025     Priority: Medium    CARDIOVASCULAR SCREENING; LDL GOAL LESS THAN 160 10/31/2010     Priority: Medium        Past Medical History:    Past Medical History:   Diagnosis Date    Current mild episode of major depressive disorder without prior episode 12/23/2019    JORDON (generalized anxiety disorder) 12/23/2019       Past Surgical History:    No past surgical history on file.    Family History:    Family History   Problem Relation Age of Onset    C.A.D. Father         CABG    Lipids Father     Respiratory Maternal Grandfather         lung problem.    Diabetes Paternal Grandfather     Hypertension Paternal Grandfather        Social History:  Marital Status:   [2]  Social History     Tobacco Use    Smoking status: Never    Smokeless tobacco: Never   Vaping Use    Vaping status: Never Used   Substance Use Topics    Alcohol use: Yes     Comment: one drink per night    Drug use: No        Medications:    Amino Acids (L-CARNITINE PO)  EPINEPHrine (EPIPEN) 0.3 MG/0.3ML injection  Glucos-Chondroit-Hyaluron-MSM (GLUCOSAMINE CHONDROITIN JOINT PO)  Omega-3 Fatty Acids (OMEGA 3 PO)          Review of Systems  See HPI  Physical Exam   BP: 117/86  Pulse: 74  Temp: 98  F (36.7   C)  Resp: 18  Height: 182.9 cm (6')  Weight: 97.5 kg (215 lb)  SpO2: 100 %      Physical Exam  /86   Pulse 74   Temp 98  F (36.7  C) (Tympanic)   Resp 18   Ht 1.829 m (6')   Wt 97.5 kg (215 lb)   SpO2 100%   BMI 29.16 kg/m    General: alert, interactive, in no apparent distress  Head: 2 cm linear laceration over the left eyebrow.  Head and face are otherwise atraumatic.  There is no bony tenderness  Nose: no rhinorrhea or epistaxis  Ears: no external auditory canal discharge or bleeding.    Eyes: Sclera nonicteric. Conjunctiva noninjected. PERRL, EOMI  Mouth: no tonsillar erythema, edema, or exudate.  Moist mucous membranes  Neck: supple, moving spontaneously no midline cervical tenderness  Lungs: No increased work of breathing.  Clear to auscultation bilaterally.  CV: RRR, peripheral pulses palpable and symmetric  Extremities: Warm and well-perfused.    Skin: no rash or diaphoresis  Neuro: CN II-XII grossly intact, strength 5/5 in UE and LEs bilaterally, sensation intact to light touch in UE and LEs bilaterally; normal gait    ED Course        [facility]    -Laceration Repair    Date/Time: 5/14/2025 2:53 PM    Performed by: Syed Madison MD  Authorized by: Syed Madison MD    Risks, benefits and alternatives discussed.      ANESTHESIA (see MAR for exact dosages):     Anesthesia method:  None  LACERATION DETAILS     Location:  Face    Face location:  L eyebrow    Length (cm):  2    REPAIR TYPE:     Repair type:  Simple    EXPLORATION:     Wound exploration: entire depth of wound probed and visualized      Wound extent: no foreign body, no signs of injury, no nerve damage, no underlying fracture and no vascular damage      TREATMENT:     Area cleansed with:  Soap and water    Amount of cleaning:  Standard    SKIN REPAIR     Repair method:  Tissue adhesive    APPROXIMATION     Approximation:  Close    POST-PROCEDURE DETAILS     Dressing:  Open (no  dressing)      PROCEDURE    Patient Tolerance:  Patient tolerated the procedure well with no immediate complications               Critical Care time:  none             No results found for this or any previous visit (from the past 24 hours).    Medications - No data to display    Assessments & Plan (with Medical Decision Making)     I have reviewed the nursing notes.    I have reviewed the findings, diagnosis, plan and need for follow up with the patient.          Medical Decision Making  Vadim Head is a 39 year old male who has no significant medical history, who presents to the ER for evaluation of eyebrow laceration.  Vitals are reviewed and reassuring.  Patient has a normal neurological exam.  He is not anticoagulated.  Low suspicion for bony or intracranial injury and I do not think head CT is indicated.  Patient's tetanus needs updated, but he refuses.  Wound was cleansed and repaired with tissue adhesive.  See procedure note for details.  No further injuries or workup needed.  Patient comfortable with discharge.  General wound cares discussed.  Return precautions discussed.        Discharge Medication List as of 5/14/2025  3:54 AM          Final diagnoses:   Eyebrow laceration, left, initial encounter       5/14/2025   [facility] EMERGENCY DEPT       Syed Madison MD  05/14/25 [id number]

## 2025-06-21 ENCOUNTER — HEALTH MAINTENANCE LETTER (OUTPATIENT)
Age: 40
End: 2025-06-21